# Patient Record
Sex: MALE | Race: WHITE | NOT HISPANIC OR LATINO | Employment: FULL TIME | URBAN - METROPOLITAN AREA
[De-identification: names, ages, dates, MRNs, and addresses within clinical notes are randomized per-mention and may not be internally consistent; named-entity substitution may affect disease eponyms.]

---

## 2017-02-13 RX ORDER — LOSARTAN POTASSIUM 50 MG/1
TABLET ORAL
Qty: 90 TAB | Refills: 3 | Status: SHIPPED | OUTPATIENT
Start: 2017-02-13 | End: 2017-06-21 | Stop reason: SDUPTHER

## 2017-02-13 NOTE — TELEPHONE ENCOUNTER
Was the patient seen in the last year in this department? Yes  Seen by Namita Ryder on 11/30/16    Does patient have an active prescription for medications requested? No     Received Request Via: Pharmacy

## 2017-02-27 ENCOUNTER — TELEPHONE (OUTPATIENT)
Dept: MEDICAL GROUP | Facility: MEDICAL CENTER | Age: 47
End: 2017-02-27

## 2017-02-27 DIAGNOSIS — Z20.2 POSSIBLE EXPOSURE TO STD: ICD-10-CM

## 2017-02-28 NOTE — TELEPHONE ENCOUNTER
.1. Caller Name: Pt                      Call Back Number:  [phone] (home)     2. Message: Pt called stating he would like lab orders placed for and STD check. Please place orders     3. Patient approves office to leave a detailed voicemail/MyChart message: yes

## 2017-03-02 LAB
HIV 1+2 AB+HIV1 P24 AG SERPL QL IA: NON REACTIVE
RPR SER QL: NON REACTIVE

## 2017-03-03 ENCOUNTER — TELEPHONE (OUTPATIENT)
Dept: MEDICAL GROUP | Facility: MEDICAL CENTER | Age: 47
End: 2017-03-03

## 2017-03-03 NOTE — TELEPHONE ENCOUNTER
----- Message from Jeet Leung M.D. sent at 3/3/2017  7:43 AM PST -----  Please notify patient is negative for HIV and syphilis. We have not received urine results yet but check for gonorrhea and chlamydia.  Jeet Leung M.D.

## 2017-03-15 ENCOUNTER — TELEPHONE (OUTPATIENT)
Dept: MEDICAL GROUP | Facility: MEDICAL CENTER | Age: 47
End: 2017-03-15

## 2017-03-15 DIAGNOSIS — A74.9 CHLAMYDIA: ICD-10-CM

## 2017-03-15 RX ORDER — AZITHROMYCIN 500 MG/1
1000 TABLET, FILM COATED ORAL ONCE
Qty: 2 TAB | Refills: 0 | Status: SHIPPED | OUTPATIENT
Start: 2017-03-15 | End: 2017-03-15

## 2017-03-15 NOTE — TELEPHONE ENCOUNTER
I sent him a prescription for azithromycin to treat chlamydia infection. Please have patient avoid sexual contact for 2 weeks.  Please complete public health form for positive STD.  Jeet Leung M.D.

## 2017-03-15 NOTE — TELEPHONE ENCOUNTER
1. Caller Name: Jason                      Call Back Number: [phone]    2. Message: Pt left message requesting results for UA test. He has discomfort in his penis and would like to get this treated. Please advise.     3. Patient approves office to leave a detailed voicemail/MyChart message: N\A

## 2017-03-27 ENCOUNTER — TELEPHONE (OUTPATIENT)
Dept: MEDICAL GROUP | Facility: MEDICAL CENTER | Age: 47
End: 2017-03-27

## 2017-03-27 NOTE — TELEPHONE ENCOUNTER
.1. Caller Name: Pt                      Call Back Number: [phone] (home)     2. Message: Pt called stating he has been feeling well. He has been very congested and coughing a lot for the last 5 days. He would like to know if there is a chance he can get something prescribed.     3. Patient approves office to leave a detailed voicemail/MyChart message: yes

## 2017-05-02 ENCOUNTER — TELEPHONE (OUTPATIENT)
Dept: MEDICAL GROUP | Facility: MEDICAL CENTER | Age: 47
End: 2017-05-02

## 2017-05-02 DIAGNOSIS — Z20.2 EXPOSURE TO STD: ICD-10-CM

## 2017-05-02 NOTE — TELEPHONE ENCOUNTER
1. Caller Name: Pt                      Call Back Number: [phone]     2. Message: Pt left message stating he previously tested positive for chlamydia. He is requesting an order for a repeat test to make sure it is cleared up.    3. Patient approves office to leave a detailed voicemail/MyChart message: N\A

## 2017-05-02 NOTE — TELEPHONE ENCOUNTER
Urine chlamydia test ordered. These have him drop off a urine sample at the lab.  Jeet Leung M.D.

## 2017-05-03 ENCOUNTER — HOSPITAL ENCOUNTER (OUTPATIENT)
Dept: LAB | Facility: MEDICAL CENTER | Age: 47
End: 2017-05-03
Attending: FAMILY MEDICINE
Payer: COMMERCIAL

## 2017-05-03 DIAGNOSIS — Z20.2 EXPOSURE TO STD: ICD-10-CM

## 2017-05-03 PROCEDURE — 87491 CHLMYD TRACH DNA AMP PROBE: CPT

## 2017-05-03 PROCEDURE — 87591 N.GONORRHOEAE DNA AMP PROB: CPT

## 2017-05-05 LAB
C TRACH DNA SPEC QL NAA+PROBE: POSITIVE
N GONORRHOEA DNA SPEC QL NAA+PROBE: NEGATIVE
SPECIMEN SOURCE: ABNORMAL

## 2017-05-08 ENCOUNTER — TELEPHONE (OUTPATIENT)
Dept: MEDICAL GROUP | Facility: MEDICAL CENTER | Age: 47
End: 2017-05-08

## 2017-05-08 DIAGNOSIS — A74.9 CHLAMYDIA: ICD-10-CM

## 2017-05-08 RX ORDER — AZITHROMYCIN 500 MG/1
1000 TABLET, FILM COATED ORAL ONCE
Qty: 2 TAB | Refills: 0 | Status: SHIPPED | OUTPATIENT
Start: 2017-05-08 | End: 2017-05-08

## 2017-05-08 NOTE — TELEPHONE ENCOUNTER
Prescription for azithromycin and sent to the pharmacy.  If his symptoms not resolve, please plan for him to come into the clinic. We will need to give him a shot and a different antibiotic.  Patient should refrain from sexual activity for at least 2 weeks of being symptom-free.  Jeet Leung M.D.

## 2017-05-08 NOTE — TELEPHONE ENCOUNTER
Pt notified. He doesn't think he was re-exposed and took both tabs of azithromycin all at once. He would like rx for new abx sent to [facility] [place]

## 2017-05-08 NOTE — TELEPHONE ENCOUNTER
----- Message from Jeet Leung M.D. sent at 5/8/2017  7:13 AM PDT -----  Please notify patient if he came back positive for chlamydia again.  We treated him 2 months ago for a chlamydia infection. Did he get reexposed? Was he able to take all the antibiotic tablets at once?  We will need to send in a new prescription for antibiotics.  Jeet Leung M.D.

## 2017-06-21 ENCOUNTER — TELEPHONE (OUTPATIENT)
Dept: MEDICAL GROUP | Facility: MEDICAL CENTER | Age: 47
End: 2017-06-21

## 2017-06-21 DIAGNOSIS — M1A.9XX0 CHRONIC GOUT WITHOUT TOPHUS, UNSPECIFIED CAUSE, UNSPECIFIED SITE: ICD-10-CM

## 2017-06-21 RX ORDER — LOSARTAN POTASSIUM 50 MG/1
50 TABLET ORAL
Qty: 90 TAB | Refills: 3 | Status: SHIPPED | OUTPATIENT
Start: 2017-06-21 | End: 2017-06-29 | Stop reason: SDUPTHER

## 2017-06-21 RX ORDER — ALLOPURINOL 300 MG/1
300 TABLET ORAL DAILY
Qty: 90 TAB | Refills: 3 | Status: SHIPPED | OUTPATIENT
Start: 2017-06-21 | End: 2017-06-29 | Stop reason: SDUPTHER

## 2017-06-21 NOTE — TELEPHONE ENCOUNTER
Was the patient seen in the last year in this department? Yes     Does patient have an active prescription for medications requested? No     Received Request Via: Pharmacy     Last seen: 11/30/2016 Aleksey

## 2017-06-21 NOTE — TELEPHONE ENCOUNTER
1. Caller Name: Demarco Hickey                                         Call Back Number: [phone]       Patient approves a detailed voicemail message: yes    Pt called in to report Symptoms for Chlamydia jave not fully resolved, per last note on this subject MD stated Pt will need to come in for another antibiotic and an injection. Pt transferred to scheduling to make an apt.

## 2017-06-22 ENCOUNTER — HOSPITAL ENCOUNTER (OUTPATIENT)
Facility: MEDICAL CENTER | Age: 47
End: 2017-06-22
Attending: NURSE PRACTITIONER
Payer: COMMERCIAL

## 2017-06-22 ENCOUNTER — OFFICE VISIT (OUTPATIENT)
Dept: MEDICAL GROUP | Facility: MEDICAL CENTER | Age: 47
End: 2017-06-22
Payer: COMMERCIAL

## 2017-06-22 VITALS
RESPIRATION RATE: 16 BRPM | BODY MASS INDEX: 38.21 KG/M2 | WEIGHT: 258 LBS | TEMPERATURE: 97.5 F | SYSTOLIC BLOOD PRESSURE: 136 MMHG | DIASTOLIC BLOOD PRESSURE: 82 MMHG | OXYGEN SATURATION: 95 % | HEIGHT: 69 IN | HEART RATE: 107 BPM

## 2017-06-22 DIAGNOSIS — E66.9 OBESITY (BMI 30-39.9): ICD-10-CM

## 2017-06-22 DIAGNOSIS — A74.9 CHLAMYDIA INFECTION: ICD-10-CM

## 2017-06-22 PROCEDURE — 99214 OFFICE O/P EST MOD 30 MIN: CPT | Performed by: FAMILY MEDICINE

## 2017-06-22 PROCEDURE — 99999 PR NO CHARGE: CPT | Performed by: FAMILY MEDICINE

## 2017-06-22 PROCEDURE — 87591 N.GONORRHOEAE DNA AMP PROB: CPT

## 2017-06-22 PROCEDURE — 87491 CHLMYD TRACH DNA AMP PROBE: CPT

## 2017-06-22 RX ORDER — DOXYCYCLINE HYCLATE 100 MG
100 TABLET ORAL 2 TIMES DAILY
Qty: 14 TAB | Refills: 0 | Status: SHIPPED | OUTPATIENT
Start: 2017-06-22 | End: 2017-06-29

## 2017-06-22 RX ORDER — CEFTRIAXONE SODIUM 250 MG/1
250 INJECTION, POWDER, FOR SOLUTION INTRAMUSCULAR; INTRAVENOUS ONCE
Status: COMPLETED | OUTPATIENT
Start: 2017-06-22 | End: 2017-06-22

## 2017-06-22 RX ADMIN — CEFTRIAXONE SODIUM 250 MG: 250 INJECTION, POWDER, FOR SOLUTION INTRAMUSCULAR; INTRAVENOUS at 15:33

## 2017-06-22 ASSESSMENT — PATIENT HEALTH QUESTIONNAIRE - PHQ9: CLINICAL INTERPRETATION OF PHQ2 SCORE: 0

## 2017-06-22 NOTE — MR AVS SNAPSHOT
"        Demarco Hickey   2017 3:00 PM   Office Visit   MRN: [id number]    Department:  [facility]   Dept Phone:  [phone]    Description:  Male : 1970   Provider:  Jeet Leung M.D.           Reason for Visit     Follow-Up     Injections           Allergies as of 2017     Allergen Noted Reactions    Nkda [No Known Drug Allergy] 2014         You were diagnosed with     Chlamydia infection   [926220]       Obesity (BMI 30-39.9)   [316764]         Vital Signs     Blood Pressure Pulse Temperature Respirations Height Weight    136/82 mmHg 107 36.4 °C (97.5 °F) 16 1.753 m (5' 9.02\") 117.028 kg (258 lb)    Body Mass Index Oxygen Saturation Smoking Status             38.08 kg/m2 95% Never Smoker          Basic Information     Date Of Birth Sex Race Ethnicity Preferred Language    1970 Male White Non- English      Problem List              ICD-10-CM Priority Class Noted - Resolved    Hypertension I10   2014 - Present    Gout, chronic M1A.9XX0   2014 - Present    Obesity E66.9   2014 - Present    Dyslipidemia E78.5   2014 - Present    Sleep apnea G47.30   2014 - Present    Possible exposure to STD Z20.2   2015 - Present    Chlamydia infection A74.9   2017 - Present    Obesity (BMI 30-39.9) E66.9   2017 - Present      Health Maintenance        Date Due Completion Dates    IMM DTaP/Tdap/Td Vaccine (1 - Tdap) 1990 ---            Current Immunizations     No immunizations on file.      Below and/or attached are the medications your provider expects you to take. Review all of your home medications and newly ordered medications with your provider and/or pharmacist. Follow medication instructions as directed by your provider and/or pharmacist. Please keep your medication list with you and share with your provider. Update the information when medications are discontinued, doses are changed, or new medications (including over-the-counter " products) are added; and carry medication information at all times in the event of emergency situations     Allergies:  NKDA - (reactions not documented)               Medications  Valid as of: June 22, 2017 -  4:01 PM    Generic Name Brand Name Tablet Size Instructions for use    Allopurinol (Tab) ZYLOPRIM 300 MG Take 1 Tab by mouth every day.        Doxycycline Hyclate (Tab) VIBRAMYCIN 100 MG Take 1 Tab by mouth 2 times a day for 7 days.        Losartan Potassium (Tab) COZAAR 50 MG Take 1 Tab by mouth every day.        .                 Medicines prescribed today were sent to:     [facility] #[id number] - [place], [place] - [address] [place] [address]    Phone: [phone] Fax: [phone]    Open 24 Hours?: No      Medication refill instructions:       If your prescription bottle indicates you have medication refills left, it is not necessary to call your provider’s office. Please contact your pharmacy and they will refill your medication.    If your prescription bottle indicates you do not have any refills left, you may request refills at any time through one of the following ways: The online [facility] system (except Urgent Care), by calling your provider’s office, or by asking your pharmacy to contact your provider’s office with a refill request. Medication refills are processed only during regular business hours and may not be available until the next business day. Your provider may request additional information or to have a follow-up visit with you prior to refilling your medication.   *Please Note: Medication refills are assigned a new Rx number when refilled electronically. Your pharmacy may indicate that no refills were authorized even though a new prescription for the same medication is available at the pharmacy. Please request the medicine by name with the pharmacy before contacting your provider for a refill.        Your To Do List     Future Labs/Procedures Complete By Expires    CHLAMYDIA/GC PCR  URINE OR SWAB  As directed 6/23/2018         MyChart Access Code: Activation code not generated  Current MyCFundedByMet Status: Active

## 2017-06-22 NOTE — ASSESSMENT & PLAN NOTE
Patient has been treated for chlamydia twice in the last 4 months. He was treated with azithromycin 1000 mg once for each infection. Since his second infection, 1 month ago, he was in the [facility] with his [place] girlfriend. He feels like he is having recurrent symptoms. No pussy drainage or skin lesions that he has noticed. Patient had a full STD check including HIV and hepatitis 3 months ago.

## 2017-06-22 NOTE — PROGRESS NOTES
"Subjective:   Demarco Hickey is a 47 y.o. male here today for chlamydia infection    Chlamydia infection  Patient has been treated for chlamydia twice in the last 4 months. He was treated with azithromycin 1000 mg once for each infection. Since his second infection, 1 month ago, he was in the [facility] with his [facility] girlfriend. He feels like he is having recurrent symptoms. No pussy drainage or skin lesions that he has noticed. Patient had a full STD check including HIV and hepatitis 3 months ago.    Obesity (BMI 30-39.9)  Patient's weight has increased over the last several years.           Current medicines (including changes today)  Current Outpatient Prescriptions   Medication Sig Dispense Refill   • doxycycline (VIBRAMYCIN) 100 MG Tab Take 1 Tab by mouth 2 times a day for 7 days. 14 Tab 0   • allopurinol (ZYLOPRIM) 300 MG Tab Take 1 Tab by mouth every day. 90 Tab 3   • losartan (COZAAR) 50 MG Tab Take 1 Tab by mouth every day. 90 Tab 3     Current Facility-Administered Medications   Medication Dose Route Frequency Provider Last Rate Last Dose   • cefTRIAXone (ROCEPHIN) injection 250 mg  250 mg Intramuscular Once Jeet Leung M.D.         He  has a past medical history of Hypertension (5/20/2014); Gout, chronic (5/20/2014); and Obesity (5/20/2014).    ROS   No fever, no purulent urethral drainage, no flank pain       Objective:     Blood pressure 136/82, pulse 107, temperature 36.4 °C (97.5 °F), resp. rate 16, height 1.753 m (5' 9.02\"), weight 117.028 kg (258 lb), SpO2 95 %. Body mass index is 38.08 kg/(m^2).   Physical Exam:  Constitutional: Alert, no distress.  Skin: Warm, dry, good turgor, no rashes in visible areas.  Eye: Equal, round and reactive, conjunctiva clear, lids normal.  Psych: Alert and oriented x3, normal affect and mood.        Assessment and Plan:   The following treatment plan was discussed    1. Chlamydia infection  Rocephin 250 mg IM given today, doxycycline 100 mg for 7 days, " emphasizing importance of being compliant with doxycycline. Advised no sexual activity 2 weeks after completing doxycycline. Advise condoms with sexual partners.  Chlamydia and gonorrhea urine sample collected.  - cefTRIAXone (ROCEPHIN) injection 250 mg; 250 mg by Intramuscular route Once.  - doxycycline (VIBRAMYCIN) 100 MG Tab; Take 1 Tab by mouth 2 times a day for 7 days.  Dispense: 14 Tab; Refill: 0  - CHLAMYDIA/GC PCR URINE OR SWAB; Future    2. Obesity (BMI 30-39.9)  - Patient identified as having weight management issue.  Appropriate orders and counseling given.      Followup: Return if symptoms worsen or fail to improve.

## 2017-06-23 DIAGNOSIS — A74.9 CHLAMYDIA INFECTION: ICD-10-CM

## 2017-06-26 ENCOUNTER — TELEPHONE (OUTPATIENT)
Dept: MEDICAL GROUP | Facility: MEDICAL CENTER | Age: 47
End: 2017-06-26

## 2017-06-26 NOTE — TELEPHONE ENCOUNTER
----- Message from Jeet Leung M.D. sent at 6/26/2017  7:37 AM PDT -----  Please notify patient that he is positive for chlamydia. Have him complete antibiotics as prescribed. His partners should also be treated by their primary care doctor or at the health department if they do not have a primary care doctor. Patient should use condoms to prevent STD exposure.  Jeet Leung M.D.

## 2017-06-29 DIAGNOSIS — M1A.9XX0 CHRONIC GOUT WITHOUT TOPHUS, UNSPECIFIED CAUSE, UNSPECIFIED SITE: ICD-10-CM

## 2017-06-29 RX ORDER — LOSARTAN POTASSIUM 50 MG/1
50 TABLET ORAL
Qty: 90 TAB | Refills: 3 | Status: SHIPPED | OUTPATIENT
Start: 2017-06-29 | End: 2018-05-31 | Stop reason: SDUPTHER

## 2017-06-29 RX ORDER — ALLOPURINOL 300 MG/1
300 TABLET ORAL DAILY
Qty: 90 TAB | Refills: 3 | Status: SHIPPED | OUTPATIENT
Start: 2017-06-29 | End: 2018-07-19 | Stop reason: SDUPTHER

## 2017-06-29 NOTE — TELEPHONE ENCOUNTER
Patient requesting mail order - 3 months supply   Was the patient seen in the last year in this department? Yes     Does patient have an active prescription for medications requested? No     Received Request Via: Pharmacy     Last seen:06/22/2017 [place]

## 2018-06-01 RX ORDER — LOSARTAN POTASSIUM 50 MG/1
TABLET ORAL
Qty: 90 TAB | Refills: 0 | Status: SHIPPED | OUTPATIENT
Start: 2018-06-01 | End: 2018-08-29 | Stop reason: SDUPTHER

## 2018-06-01 NOTE — TELEPHONE ENCOUNTER
Refill done. Patient is due for annual appointment. Please have patient schedule.  Jeet Leung M.D.

## 2018-06-19 ENCOUNTER — OFFICE VISIT (OUTPATIENT)
Dept: MEDICAL GROUP | Facility: MEDICAL CENTER | Age: 48
End: 2018-06-19
Payer: COMMERCIAL

## 2018-06-19 VITALS
HEIGHT: 69 IN | TEMPERATURE: 98 F | SYSTOLIC BLOOD PRESSURE: 102 MMHG | OXYGEN SATURATION: 98 % | DIASTOLIC BLOOD PRESSURE: 90 MMHG | WEIGHT: 265 LBS | HEART RATE: 107 BPM | BODY MASS INDEX: 39.25 KG/M2

## 2018-06-19 DIAGNOSIS — N50.819 TESTICULAR PAIN, UNSPECIFIED: ICD-10-CM

## 2018-06-19 DIAGNOSIS — A74.9 CHLAMYDIA: ICD-10-CM

## 2018-06-19 DIAGNOSIS — E78.5 HYPERLIPIDEMIA LDL GOAL <100: ICD-10-CM

## 2018-06-19 DIAGNOSIS — R10.30 LOWER ABDOMINAL PAIN: ICD-10-CM

## 2018-06-19 DIAGNOSIS — E66.9 OBESITY (BMI 35.0-39.9 WITHOUT COMORBIDITY): ICD-10-CM

## 2018-06-19 PROCEDURE — 99214 OFFICE O/P EST MOD 30 MIN: CPT | Performed by: NURSE PRACTITIONER

## 2018-06-19 ASSESSMENT — PATIENT HEALTH QUESTIONNAIRE - PHQ9: CLINICAL INTERPRETATION OF PHQ2 SCORE: 0

## 2018-06-19 NOTE — PROGRESS NOTES
Subjective:     Demarco Hickey is a 48 y.o. male who presents with lower abd pain.    HPI:     Seen in f/u for lower abd pain.  He has a hx of inguinal hernia repair.  He has been working out to loose weight.  That is when the pain started.  He is careful with lifting.   Pain occurs intermittently.  bm's are wnl.  No dysuria.  Most of the pain is in his testicles.  He doesn't know which one is worse.  No hx of testicular cancer.  That is the sx that he had in the past with his previous inguinal hernia.   He has a sore in his rt gum.  He is going to see an oral surgeon in august.  Painful.  Will get better and then wiorse.  He does not chew tobacco.    His last lab in 2016 showed elevated trg and LDL.  He is overdue repeat lab.    He recently had chlamydia.  Was trerated but never f/u for recheck.  No fever, chills or sweating.  No penile dg.      Patient Active Problem List    Diagnosis Date Noted   • Obesity (BMI 35.0-39.9 without comorbidity) ([facility]) 06/19/2018   • Chlamydia infection 06/22/2017   • Obesity (BMI 30-39.9) 06/22/2017   • Possible exposure to STD 08/12/2015   • Hypertension 05/20/2014   • Gout, chronic 05/20/2014   • Obesity 05/20/2014   • Dyslipidemia 05/20/2014   • Sleep apnea 05/20/2014       Current medicines (including changes today)  Current Outpatient Prescriptions   Medication Sig Dispense Refill   • losartan (COZAAR) 50 MG Tab TAKE 1 TABLET DAILY 90 Tab 0   • allopurinol (ZYLOPRIM) 300 MG Tab Take 1 Tab by mouth every day. 90 Tab 3     No current facility-administered medications for this visit.        Allergies   Allergen Reactions   • Nkda [No Known Drug Allergy]        ROS  Constitutional: Negative. Negative for fever, chills, weight loss, malaise/fatigue and diaphoresis.   HENT: Negative. Negative for hearing loss, ear pain, nosebleeds, congestion, sore throat, neck pain, tinnitus and ear discharge.   Respiratory: Negative. Negative for cough, hemoptysis, sputum production, shortness of  "breath, wheezing and stridor.   Cardiovascular: Negative. Negative for chest pain, palpitations, orthopnea, claudication, leg swelling and PND.   Gastrointestinal: Denies nausea, vomiting, diarrhea, constipation, heartburn, melena or hematochezia.  Genitourinary: Denies dysuria, hematuria, urinary incontinence, frequency or urgency.        Objective:     Blood pressure 102/90, pulse (!) 107, temperature 36.7 °C (98 °F), height 1.753 m (5' 9\"), weight 120.2 kg (265 lb), SpO2 98 %. Body mass index is 39.13 kg/m².    Physical Exam:  Physical Exam   Vitals reviewed.  Constitutional: oriented to person, place, and time. appears well-developed and well-nourished. No distress.   Cardiovascular: Normal rate, regular rhythm, normal heart sounds and intact distal pulses.  Exam reveals no gallop and no friction rub.  No murmur heard.  No carotid bruits.   Pulmonary/Chest: Effort normal and breath sounds normal. No stridor. No respiratory distress. no wheezes or rales. exhibits no tenderness.   Musculoskeletal: Normal range of motion. exhibits no edema. diana pedal pulses 2+.  Lymphadenopathy: no cervical or supraclavicular adenopathy.   Abd:  No CVAT,  Soft,  Bs noted in all quadrants.  No HSM.  No abdominal tenderness.  Poss left inguinal hernia palp.  No testicular nodules noted.    Neurological: alert and oriented to person, place, and time. exhibits normal muscle tone. Coordination normal.   Skin: Skin is warm and dry. no diaphoresis.   Psychiatric: normal mood and affect. behavior is normal.        Assessment and Plan:     The following treatment plan was discussed:    1. Lower abdominal pain  US-ABDOMEN LIMITED    check abd us limited for hernia.  if neg and pain continues and not r/t chlamydia then consider testicular us. f/u with pt with results.    2. Testicular pain, unspecified  US-ABDOMEN LIMITED    no nodules noted.  will consider scrotal us if other w/u is neg   3. Hyperlipidemia LDL goal <100  CMP14+LP    needs " updated LP, CMP.  trying to improve diet and exercise.  dolab and f/u with HS   4. Chlamydia  CHLAMYDIA, SWAB/URINE, PCR    recent tx for infection.  will check for clearing.     5. Obesity (BMI 35.0-39.9 without comorbidity)     6. BMI 39.0-39.9,adult  Patient identified as having weight management issue.  Appropriate orders and counseling given.         Followup: Return if symptoms worsen or fail to improve.

## 2018-06-20 ENCOUNTER — TELEPHONE (OUTPATIENT)
Dept: MEDICAL GROUP | Facility: MEDICAL CENTER | Age: 48
End: 2018-06-20

## 2018-06-20 LAB
C TRACH RRNA SPEC QL NAA+PROBE: NEGATIVE
N GONORRHOEA RRNA SPEC QL NAA+PROBE: NEGATIVE

## 2018-06-20 NOTE — LETTER
June 26, 2018        Demarco Hickey  [address]   [place] [address]        Dear Demarco:    Shanice Argueta's office has tried contacting you. At your earliest convenience please contact our office at [phone].          If you have any questions or concerns, please don't hesitate to call.        Sincerely,        RIDGE Choi.    Electronically Signed

## 2018-06-21 ENCOUNTER — HOSPITAL ENCOUNTER (OUTPATIENT)
Dept: RADIOLOGY | Facility: MEDICAL CENTER | Age: 48
End: 2018-06-21
Attending: NURSE PRACTITIONER
Payer: COMMERCIAL

## 2018-06-21 DIAGNOSIS — N50.819 TESTICULAR PAIN, UNSPECIFIED: ICD-10-CM

## 2018-06-21 DIAGNOSIS — R10.30 LOWER ABDOMINAL PAIN: ICD-10-CM

## 2018-06-21 PROCEDURE — 76857 US EXAM PELVIC LIMITED: CPT

## 2018-06-23 ENCOUNTER — TELEPHONE (OUTPATIENT)
Dept: MEDICAL GROUP | Facility: MEDICAL CENTER | Age: 48
End: 2018-06-23

## 2018-06-23 NOTE — TELEPHONE ENCOUNTER
Please let pt know that the abd us shows small left inguinal hernia.  if he is ready to have it fixed i will refer to surgery.  Just let me know.

## 2018-07-19 DIAGNOSIS — M1A.9XX0 CHRONIC GOUT WITHOUT TOPHUS, UNSPECIFIED CAUSE, UNSPECIFIED SITE: ICD-10-CM

## 2018-07-20 RX ORDER — ALLOPURINOL 300 MG/1
TABLET ORAL
Qty: 90 TAB | Refills: 3 | Status: SHIPPED | OUTPATIENT
Start: 2018-07-20 | End: 2019-07-14 | Stop reason: SDUPTHER

## 2018-08-30 RX ORDER — LOSARTAN POTASSIUM 50 MG/1
TABLET ORAL
Qty: 90 TAB | Refills: 3 | Status: SHIPPED | OUTPATIENT
Start: 2018-08-30 | End: 2018-11-15

## 2018-11-14 ENCOUNTER — TELEPHONE (OUTPATIENT)
Dept: MEDICAL GROUP | Facility: MEDICAL CENTER | Age: 48
End: 2018-11-14

## 2018-11-15 RX ORDER — ENALAPRIL MALEATE 20 MG/1
20 TABLET ORAL DAILY
Qty: 90 TAB | Refills: 3 | Status: SHIPPED | OUTPATIENT
Start: 2018-11-15 | End: 2019-10-17 | Stop reason: SDUPTHER

## 2018-11-15 NOTE — TELEPHONE ENCOUNTER
An alternative would be enalapril 20 mg daily.  Please ask if he would like me to send a prescription to [facility] for enalapril.  Jeet Leung M.D.

## 2018-11-15 NOTE — TELEPHONE ENCOUNTER
Patient is concerned that Losartan causes cancer. He would like to be prescribed an alternative medication. Please advise.

## 2019-05-23 ENCOUNTER — TELEPHONE (OUTPATIENT)
Dept: MEDICAL GROUP | Facility: MEDICAL CENTER | Age: 49
End: 2019-05-23

## 2019-05-23 DIAGNOSIS — Z20.2 STD EXPOSURE: ICD-10-CM

## 2019-05-23 NOTE — TELEPHONE ENCOUNTER
Patient reports possible exposure to STDs. He is requesting lab order to screen for this. Please advise.

## 2019-05-24 ENCOUNTER — HOSPITAL ENCOUNTER (OUTPATIENT)
Dept: LAB | Facility: MEDICAL CENTER | Age: 49
End: 2019-05-24
Attending: FAMILY MEDICINE
Payer: COMMERCIAL

## 2019-05-24 DIAGNOSIS — Z20.2 STD EXPOSURE: ICD-10-CM

## 2019-05-24 LAB
HCV AB SER QL: NEGATIVE
HIV 1+2 AB+HIV1 P24 AG SERPL QL IA: NON REACTIVE
TREPONEMA PALLIDUM IGG+IGM AB [PRESENCE] IN SERUM OR PLASMA BY IMMUNOASSAY: NON REACTIVE

## 2019-05-24 PROCEDURE — 86780 TREPONEMA PALLIDUM: CPT

## 2019-05-24 PROCEDURE — 36415 COLL VENOUS BLD VENIPUNCTURE: CPT

## 2019-05-24 PROCEDURE — 86803 HEPATITIS C AB TEST: CPT

## 2019-05-24 PROCEDURE — 87491 CHLMYD TRACH DNA AMP PROBE: CPT

## 2019-05-24 PROCEDURE — 87389 HIV-1 AG W/HIV-1&-2 AB AG IA: CPT

## 2019-05-24 PROCEDURE — 87591 N.GONORRHOEAE DNA AMP PROB: CPT

## 2019-05-25 LAB
C TRACH DNA SPEC QL NAA+PROBE: NEGATIVE
N GONORRHOEA DNA SPEC QL NAA+PROBE: NEGATIVE
SPECIMEN SOURCE: NORMAL

## 2019-05-28 ENCOUNTER — TELEPHONE (OUTPATIENT)
Dept: MEDICAL GROUP | Facility: MEDICAL CENTER | Age: 49
End: 2019-05-28

## 2019-05-28 NOTE — TELEPHONE ENCOUNTER
----- Message from Jeet Leung M.D. sent at 5/28/2019  7:28 AM PDT -----  Please notify patient that all STD testing is normal.    Dr. Leung

## 2019-07-14 DIAGNOSIS — M1A.9XX0 CHRONIC GOUT WITHOUT TOPHUS, UNSPECIFIED CAUSE, UNSPECIFIED SITE: ICD-10-CM

## 2019-07-15 RX ORDER — ALLOPURINOL 300 MG/1
TABLET ORAL
Qty: 90 TAB | Refills: 0 | Status: SHIPPED | OUTPATIENT
Start: 2019-07-15 | End: 2019-10-13 | Stop reason: SDUPTHER

## 2019-10-18 RX ORDER — ENALAPRIL MALEATE 20 MG/1
TABLET ORAL
Qty: 90 TAB | Refills: 0 | Status: SHIPPED | OUTPATIENT
Start: 2019-10-18 | End: 2020-01-21

## 2020-01-19 NOTE — LETTER
January 21, 2020        Demarco Hickey  [address]   [place] [address]        Dear Demarco:    After careful review of your chart, we have noted you are due for  an annual appointment.  We request you call our office at [phone] at your earliest convenience and     We look forward to scheduling an appointment for you, so that we may provide you with the safest and most complete medical care.        If you have any questions or concerns, please don't hesitate to call.        Sincerely,        Roslyn Jenkins P.A.-C.    Electronically Signed

## 2020-01-21 RX ORDER — ENALAPRIL MALEATE 20 MG/1
TABLET ORAL
Qty: 90 TAB | Refills: 0 | Status: SHIPPED | OUTPATIENT
Start: 2020-01-21 | End: 2020-04-20

## 2020-05-22 ENCOUNTER — TELEPHONE (OUTPATIENT)
Dept: HEALTH INFORMATION MANAGEMENT | Facility: OTHER | Age: 50
End: 2020-05-22

## 2020-05-22 NOTE — TELEPHONE ENCOUNTER
1. Caller Name: Jason                 Call Back Number: [phone]  [facility] PCP or Specialty Provider: Yes Jeet Leung        2.  Does patient have any new or worsening symptoms of respiratory illness? Yes, the patient reports the following respiratory symptoms: cough, muscle pain, headache and nausea. Taking baby aspirin as advised by MD for HTN but also to help his symptoms. No other OTC medications at this time. Reports general tightness in chest. No history of anginal episodes. Did experience an episode of light-headness/dizziness yesterday after running errands. No diaphoresis with episode. No heart palpitations/bounding heart beat. Does not own a BP cuff; currently taking enalapril. States he suspects he has sleep apnea as he snores; no orthopnea. Not followed by Cardiologist.    3.  Does patient have any comoribidities? None HTN, obesity    4.  Has the patient traveled in the last 14 days OR had any known contact with someone who is suspected or confirmed to have COVID-19?  No.    5. Disposition: Offered patient virtual visit to limit potential exposure to others; patient also given self care instructions. Virtual visit with PCP scheduled as patient is due for annual visit, as well. Patient given [facility] contact information as well as new additional [facility] testing site locations as patient states that he is booked to travel to [place] to visit his parents shortly and is concerned.      Note routed to [facility] Provider: FYI only.

## 2020-05-28 ENCOUNTER — APPOINTMENT (OUTPATIENT)
Dept: MEDICAL GROUP | Facility: MEDICAL CENTER | Age: 50
End: 2020-05-28
Payer: COMMERCIAL

## 2020-06-18 ENCOUNTER — TELEMEDICINE (OUTPATIENT)
Dept: MEDICAL GROUP | Facility: MEDICAL CENTER | Age: 50
End: 2020-06-18
Payer: COMMERCIAL

## 2020-06-18 VITALS — HEIGHT: 69 IN | WEIGHT: 260 LBS | BODY MASS INDEX: 38.51 KG/M2

## 2020-06-18 DIAGNOSIS — E78.5 DYSLIPIDEMIA: ICD-10-CM

## 2020-06-18 DIAGNOSIS — M1A.9XX0 CHRONIC GOUT WITHOUT TOPHUS, UNSPECIFIED CAUSE, UNSPECIFIED SITE: ICD-10-CM

## 2020-06-18 DIAGNOSIS — Z00.00 ANNUAL PHYSICAL EXAM: ICD-10-CM

## 2020-06-18 DIAGNOSIS — I10 ESSENTIAL HYPERTENSION: ICD-10-CM

## 2020-06-18 DIAGNOSIS — H92.01 RIGHT EAR PAIN: ICD-10-CM

## 2020-06-18 PROBLEM — K40.90 LEFT INGUINAL HERNIA: Status: ACTIVE | Noted: 2020-06-18

## 2020-06-18 PROBLEM — A74.9 CHLAMYDIA INFECTION: Status: RESOLVED | Noted: 2017-06-22 | Resolved: 2020-06-18

## 2020-06-18 PROCEDURE — 99396 PREV VISIT EST AGE 40-64: CPT | Mod: 95,CR | Performed by: FAMILY MEDICINE

## 2020-06-18 RX ORDER — ALLOPURINOL 300 MG/1
300 TABLET ORAL DAILY
Qty: 90 TAB | Refills: 3 | Status: SHIPPED | OUTPATIENT
Start: 2020-06-18 | End: 2021-03-31 | Stop reason: SDUPTHER

## 2020-06-18 RX ORDER — ENALAPRIL MALEATE 20 MG/1
20 TABLET ORAL DAILY
Qty: 90 TAB | Refills: 3 | Status: SHIPPED | OUTPATIENT
Start: 2020-06-18 | End: 2021-03-31 | Stop reason: SDUPTHER

## 2020-06-18 NOTE — PROGRESS NOTES
Telemedicine Visit: Established Patient     This encounter was conducted via Zoom .   Verbal consent was obtained. Patient's identity was verified.    Subjective:   CC: adelaida Hickey is a 50 y.o. male presenting for evaluation and management of:    Patient is currently tolerating allopurinol 300 mg daily and enalapril 20 mg daily.  He has not checked his blood pressure recently.  Patient is recently  and he will be taking a road trip across the country to [facility], where his parents live.    Patient has had right ear pain for the last 3 weeks, on and off, feeling like fluid in his ear.  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  Patient is requesting referral to see an ear specialist.    Patient has not done blood work in approximately 4 years.    ROS   Denies any recent fevers or chills. No chest pains or shortness of breath.     Allergies   Allergen Reactions   • Nkda [No Known Drug Allergy]        Current medicines (including changes today)  Current Outpatient Medications   Medication Sig Dispense Refill   • allopurinol (ZYLOPRIM) 300 MG Tab Take 1 Tab by mouth every day. 90 Tab 3   • enalapril (VASOTEC) 20 MG tablet Take 1 Tab by mouth every day. 90 Tab 3     No current facility-administered medications for this visit.        Patient Active Problem List    Diagnosis Date Noted   • Left inguinal hernia 06/18/2020   • Obesity (BMI 35.0-39.9 without comorbidity) ([facility]) 06/19/2018   • Obesity (BMI 30-39.9) 06/22/2017   • Hypertension 05/20/2014   • Gout, chronic 05/20/2014   • Obesity 05/20/2014   • Dyslipidemia 05/20/2014   • Sleep apnea 05/20/2014       Family History   Problem Relation Age of Onset   • Diabetes Mother    • Hypertension Mother    • Hypertension Father    • Hypertension Brother        He  has a past medical history of Gout, chronic (5/20/2014), Hypertension (5/20/2014), and Obesity (5/20/2014).  He  has a past surgical history that includes hernia  "repair (1988).       Objective:   Vitals obtained by patient:  Ht 1.753 m (5' 9\")   Wt 117.9 kg (260 lb)     Physical Exam:  Constitutional: Alert, no distress, well-groomed.  Skin: No rashes in visible areas.  Eye: Round. Conjunctiva clear, lids normal. No icterus.   ENMT: Lips pink without lesions, good dentition, moist mucous membranes. Phonation normal.  Neck: No masses, no thyromegaly. Moves freely without pain.  CV: Pulse as reported by patient  Respiratory: Unlabored respiratory effort, no cough or audible wheeze  Psych: Alert and oriented x3, normal affect and mood.       Assessment and Plan:   The following treatment plan was discussed:     1. Annual physical exam  Encouraged healthy lifestyle.  Check labs and call with results.  - CBC WITH DIFFERENTIAL; Future  - Comp Metabolic Panel; Future  - Lipid Profile; Future  - HEMOGLOBIN A1C; Future  - TSH WITH REFLEX TO FT4; Future  - PROSTATE SPECIFIC AG SCREENING; Future  - URIC ACID; Future    2. Right ear pain  Referral to ENT per patient's request.  Unable to do an ear exam over Zoom.  - REFERRAL TO ENT    3. Chronic gout without tophus, unspecified cause, unspecified site  Controlled.  Continue allopurinol.  - allopurinol (ZYLOPRIM) 300 MG Tab; Take 1 Tab by mouth every day.  Dispense: 90 Tab; Refill: 3    4. Essential hypertension  Advised patient to monitor blood pressure.  Continue enalapril.  - enalapril (VASOTEC) 20 MG tablet; Take 1 Tab by mouth every day.  Dispense: 90 Tab; Refill: 3    5. Dyslipidemia  Check labs and call with results.          Follow-up: Return in about 1 year (around 6/18/2021) for Annual.           "

## 2021-03-31 ENCOUNTER — OFFICE VISIT (OUTPATIENT)
Dept: MEDICAL GROUP | Facility: PHYSICIAN GROUP | Age: 51
End: 2021-03-31
Payer: COMMERCIAL

## 2021-03-31 VITALS
BODY MASS INDEX: 38.79 KG/M2 | TEMPERATURE: 97.7 F | SYSTOLIC BLOOD PRESSURE: 100 MMHG | HEIGHT: 69 IN | DIASTOLIC BLOOD PRESSURE: 80 MMHG | OXYGEN SATURATION: 93 % | WEIGHT: 261.91 LBS | HEART RATE: 86 BPM

## 2021-03-31 DIAGNOSIS — M1A.9XX0 CHRONIC GOUT WITHOUT TOPHUS, UNSPECIFIED CAUSE, UNSPECIFIED SITE: ICD-10-CM

## 2021-03-31 DIAGNOSIS — N52.9 ERECTILE DYSFUNCTION, UNSPECIFIED ERECTILE DYSFUNCTION TYPE: ICD-10-CM

## 2021-03-31 DIAGNOSIS — I10 ESSENTIAL HYPERTENSION: ICD-10-CM

## 2021-03-31 DIAGNOSIS — Z23 NEED FOR TDAP VACCINATION: ICD-10-CM

## 2021-03-31 DIAGNOSIS — Z12.5 SCREENING FOR PROSTATE CANCER: ICD-10-CM

## 2021-03-31 DIAGNOSIS — E78.5 DYSLIPIDEMIA: ICD-10-CM

## 2021-03-31 DIAGNOSIS — H61.23 IMPACTED CERUMEN, BILATERAL: ICD-10-CM

## 2021-03-31 PROCEDURE — 99214 OFFICE O/P EST MOD 30 MIN: CPT | Mod: 25 | Performed by: FAMILY MEDICINE

## 2021-03-31 PROCEDURE — 90715 TDAP VACCINE 7 YRS/> IM: CPT | Performed by: FAMILY MEDICINE

## 2021-03-31 PROCEDURE — 69210 REMOVE IMPACTED EAR WAX UNI: CPT | Performed by: FAMILY MEDICINE

## 2021-03-31 PROCEDURE — 90471 IMMUNIZATION ADMIN: CPT | Performed by: FAMILY MEDICINE

## 2021-03-31 RX ORDER — ALLOPURINOL 300 MG/1
300 TABLET ORAL DAILY
Qty: 90 TABLET | Refills: 3 | Status: SHIPPED | OUTPATIENT
Start: 2021-03-31 | End: 2022-05-16

## 2021-03-31 RX ORDER — ENALAPRIL MALEATE 20 MG/1
20 TABLET ORAL DAILY
Qty: 90 TABLET | Refills: 3 | Status: SHIPPED | OUTPATIENT
Start: 2021-03-31 | End: 2022-05-24

## 2021-03-31 ASSESSMENT — PATIENT HEALTH QUESTIONNAIRE - PHQ9: CLINICAL INTERPRETATION OF PHQ2 SCORE: 0

## 2021-04-01 NOTE — PROGRESS NOTES
Subjective:      Demarco Hickey is a 50 y.o. male who presents with New Patient (N2U/Madhu)            HPI     This is a 50-year-old male who was previously under the care of Dr. Jeet Leung.  He is here to get established with me because previous PCP left the practice.    He has hypertension and this is under control on enalapril 20 mg 1 tablet daily.    He also has history of gout involving the ankles of both feet.  He has been on allopurinol 300 mg and he said he only takes it 1 tablet every other day.  He said he has not had an attack in 2 to 3 years time.  If he gets an attack he takes Aleve with good results.    He said he was prescribed Viagra long time ago for ED and it worked very well for him without side effects.  He said he did not have the need to take it for a while.  He recently got  about 2 years ago and lately he feels that his erection is not lasting as long as he expects and he is asking to have a prescription for the Viagra.    He also wants me to check his ears.  From time to time he feels some crunching noise in the right ear but he denies any pain or decreased hearing.    He has history of dyslipidemia which is managed with his own efforts with the last lipid panel was a while ago in 2016 with total cholesterol of 202, triglycerides 168, HDL 31, .    He needs to get Tdap.  He already completed the COVID-19 vaccination.  He will turn 51 in April and so he will need screening PSA as well as screening colonoscopy.  He will need to get a flu shot in the fall.    I reviewed the following    Past Medical History:   Diagnosis Date   • Gout, chronic 5/20/2014   • Hypertension 5/20/2014   • Obesity 5/20/2014        Past Surgical History:   Procedure Laterality Date   • HERNIA REPAIR  1988    Ingunial - right side       Allergies   Allergen Reactions   • Nkda [No Known Drug Allergy]        Current Outpatient Medications   Medication Sig Dispense Refill   • allopurinol (ZYLOPRIM) 300  MG Tab Take 1 tablet by mouth every day. 90 tablet 3   • enalapril (VASOTEC) 20 MG tablet Take 1 tablet by mouth every day. 90 tablet 3     No current facility-administered medications for this visit.        Family History   Problem Relation Age of Onset   • Diabetes Mother    • Hypertension Mother    • Hypertension Father    • Hypertension Brother        Social History     Socioeconomic History   • Marital status:      Spouse name: Not on file   • Number of children: 0   • Years of education: Not on file   • Highest education level: Not on file   Occupational History   • Occupation:    Tobacco Use   • Smoking status: Never Smoker   • Smokeless tobacco: Never Used   Substance and Sexual Activity   • Alcohol use: Yes     Alcohol/week: 0.6 oz     Types: 1 Standard drinks or equivalent per week   • Drug use: No   • Sexual activity: Yes     Partners: Female   Other Topics Concern   • Not on file   Social History Narrative   • Not on file     Social Determinants of Health     Financial Resource Strain:    • Difficulty of Paying Living Expenses:    Food Insecurity:    • Worried About Running Out of Food in the Last Year:    • Ran Out of Food in the Last Year:    Transportation Needs:    • Lack of Transportation (Medical):    • Lack of Transportation (Non-Medical):    Physical Activity:    • Days of Exercise per Week:    • Minutes of Exercise per Session:    Stress:    • Feeling of Stress :    Social Connections:    • Frequency of Communication with Friends and Family:    • Frequency of Social Gatherings with Friends and Family:    • Attends [employer] Services:    • Active Member of Clubs or Organizations:    • Attends Club or Organization Meetings:    • Marital Status:    Intimate Partner Violence:    • Fear of Current or Ex-Partner:    • Emotionally Abused:    • Physically Abused:    • Sexually Abused:         ROS     As per HPI, the rest are negative.       Objective:     /80 (BP Location: Left  "arm, Patient Position: Sitting, BP Cuff Size: Adult)   Pulse 86   Temp 36.5 °C (97.7 °F) (Temporal)   Ht 1.753 m (5' 9\")   Wt 119 kg (261 lb 14.5 oz)   SpO2 93%   BMI 38.68 kg/m²      Physical Exam  Vitals and nursing note reviewed.   Constitutional:       General: He is not in acute distress.     Appearance: He is well-developed. He is not diaphoretic.   HENT:      Head: Normocephalic and atraumatic.      Right Ear: Tympanic membrane and external ear normal.      Left Ear: Tympanic membrane and external ear normal.      Ears:      Comments: Positive excessive cerumen both ear canals partially blocking the canal, tympanic membranes partially visible without evidence of infection     Nose: Nose normal.      Mouth/Throat:      Pharynx: No oropharyngeal exudate.   Eyes:      General: No scleral icterus.        Right eye: No discharge.         Left eye: No discharge.      Conjunctiva/sclera: Conjunctivae normal.      Pupils: Pupils are equal, round, and reactive to light.   Neck:      Thyroid: No thyromegaly.      Vascular: No JVD.      Trachea: No tracheal deviation.   Cardiovascular:      Rate and Rhythm: Normal rate and regular rhythm.      Heart sounds: Normal heart sounds. No murmur. No friction rub. No gallop.    Pulmonary:      Effort: Pulmonary effort is normal. No respiratory distress.      Breath sounds: Normal breath sounds. No stridor. No wheezing or rales.   Chest:      Chest wall: No tenderness.   Abdominal:      General: Bowel sounds are normal. There is no distension.      Palpations: Abdomen is soft. There is no mass.      Tenderness: There is no abdominal tenderness. There is no guarding or rebound.      Hernia: No hernia is present.   Musculoskeletal:         General: No tenderness or deformity. Normal range of motion.      Cervical back: Normal range of motion and neck supple.   Lymphadenopathy:      Cervical: No cervical adenopathy.   Skin:     General: Skin is warm and dry.      Coloration: " Skin is not pale.      Findings: No erythema or rash.   Neurological:      Mental Status: He is alert and oriented to person, place, and time.      Cranial Nerves: No cranial nerve deficit.      Motor: No abnormal muscle tone.      Coordination: Coordination normal.      Deep Tendon Reflexes: Reflexes are normal and symmetric. Reflexes normal.   Psychiatric:         Behavior: Behavior normal.         Thought Content: Thought content normal.         Judgment: Judgment normal.                      Assessment/Plan:        1. Essential hypertension  Controlled on enalapril.  Continue medication.  We will do updated blood work.  - Lipid Profile; Future  - Comp Metabolic Panel; Future  - CBC WITH DIFFERENTIAL; Future  - PROSTATE SPECIFIC AG SCREENING; Future  - URIC ACID; Future  - enalapril (VASOTEC) 20 MG tablet; Take 1 tablet by mouth every day.  Dispense: 90 tablet; Refill: 3    2. Dyslipidemia  This is managed with his own efforts.  Has been a while since the last lipid panel.  We will do updated lipid panel.  We will treat depending on 10-year ASCVD risk score.  - Lipid Profile; Future  - Comp Metabolic Panel; Future  - CBC WITH DIFFERENTIAL; Future  - PROSTATE SPECIFIC AG SCREENING; Future  - URIC ACID; Future    3. Chronic gout without tophus, unspecified cause, unspecified site  Has not had an attack in 2 to 3 years.  He is taking allopurinol 300 mg 1 tablet every other day only but he will continue taking this dose and the same frequency.  I will check uric acid level.  - Lipid Profile; Future  - Comp Metabolic Panel; Future  - CBC WITH DIFFERENTIAL; Future  - PROSTATE SPECIFIC AG SCREENING; Future  - URIC ACID; Future  - allopurinol (ZYLOPRIM) 300 MG Tab; Take 1 tablet by mouth every day.  Dispense: 90 tablet; Refill: 3    4. Erectile dysfunction, unspecified erectile dysfunction type  I discussed mechanism of action of Viagra, potential interaction with nitrates, potential side effects.  At this time he said  he will not get the prescription and he will let me know if he will needed in the future.    5. Impacted cerumen, bilateral  Cerumen removed from both ear canals by scooping them out using cerumen curette with good success.  Reexamination showed intact tympanic membranes without evidence of infection.    6. Screening for prostate cancer  Screening PSA was ordered.  - Lipid Profile; Future  - Comp Metabolic Panel; Future  - CBC WITH DIFFERENTIAL; Future  - PROSTATE SPECIFIC AG SCREENING; Future  - URIC ACID; Future    7. Need for Tdap vaccination  He was given Tdap.  - Tdap Vaccine =>6YO IM    Return in 6 months for follow-up.      Please note that this dictation was created using voice recognition software. I have worked with consultants from the vendor as well as technical experts from [facility]  [employer] to optimize the interface. I have made every reasonable attempt to correct obvious errors, but I expect that there are errors of grammar and possibly content I did not discover before finalizing the note.

## 2023-02-02 DIAGNOSIS — I10 ESSENTIAL HYPERTENSION: ICD-10-CM

## 2023-02-02 DIAGNOSIS — M1A.9XX0 CHRONIC GOUT WITHOUT TOPHUS, UNSPECIFIED CAUSE, UNSPECIFIED SITE: ICD-10-CM

## 2023-02-02 RX ORDER — ALLOPURINOL 300 MG/1
300 TABLET ORAL DAILY
Qty: 90 TABLET | Refills: 1 | Status: SHIPPED | OUTPATIENT
Start: 2023-02-02 | End: 2023-05-09 | Stop reason: SDUPTHER

## 2023-02-02 RX ORDER — ENALAPRIL MALEATE 20 MG/1
20 TABLET ORAL DAILY
Qty: 90 TABLET | Refills: 1 | Status: SHIPPED | OUTPATIENT
Start: 2023-02-02 | End: 2023-05-09 | Stop reason: SDUPTHER

## 2023-05-08 SDOH — HEALTH STABILITY: PHYSICAL HEALTH: ON AVERAGE, HOW MANY MINUTES DO YOU ENGAGE IN EXERCISE AT THIS LEVEL?: 10 MIN

## 2023-05-08 SDOH — ECONOMIC STABILITY: TRANSPORTATION INSECURITY
IN THE PAST 12 MONTHS, HAS LACK OF RELIABLE TRANSPORTATION KEPT YOU FROM MEDICAL APPOINTMENTS, MEETINGS, WORK OR FROM GETTING THINGS NEEDED FOR DAILY LIVING?: NO

## 2023-05-08 SDOH — ECONOMIC STABILITY: FOOD INSECURITY: WITHIN THE PAST 12 MONTHS, THE FOOD YOU BOUGHT JUST DIDN'T LAST AND YOU DIDN'T HAVE MONEY TO GET MORE.: NEVER TRUE

## 2023-05-08 SDOH — ECONOMIC STABILITY: INCOME INSECURITY: HOW HARD IS IT FOR YOU TO PAY FOR THE VERY BASICS LIKE FOOD, HOUSING, MEDICAL CARE, AND HEATING?: SOMEWHAT HARD

## 2023-05-08 SDOH — ECONOMIC STABILITY: HOUSING INSECURITY
IN THE LAST 12 MONTHS, WAS THERE A TIME WHEN YOU DID NOT HAVE A STEADY PLACE TO SLEEP OR SLEPT IN A SHELTER (INCLUDING NOW)?: NO

## 2023-05-08 SDOH — ECONOMIC STABILITY: FOOD INSECURITY: WITHIN THE PAST 12 MONTHS, YOU WORRIED THAT YOUR FOOD WOULD RUN OUT BEFORE YOU GOT MONEY TO BUY MORE.: NEVER TRUE

## 2023-05-08 SDOH — ECONOMIC STABILITY: HOUSING INSECURITY: IN THE LAST 12 MONTHS, HOW MANY PLACES HAVE YOU LIVED?: 2

## 2023-05-08 SDOH — ECONOMIC STABILITY: TRANSPORTATION INSECURITY
IN THE PAST 12 MONTHS, HAS THE LACK OF TRANSPORTATION KEPT YOU FROM MEDICAL APPOINTMENTS OR FROM GETTING MEDICATIONS?: NO

## 2023-05-08 SDOH — ECONOMIC STABILITY: INCOME INSECURITY: IN THE LAST 12 MONTHS, WAS THERE A TIME WHEN YOU WERE NOT ABLE TO PAY THE MORTGAGE OR RENT ON TIME?: NO

## 2023-05-08 SDOH — ECONOMIC STABILITY: TRANSPORTATION INSECURITY
IN THE PAST 12 MONTHS, HAS LACK OF TRANSPORTATION KEPT YOU FROM MEETINGS, WORK, OR FROM GETTING THINGS NEEDED FOR DAILY LIVING?: NO

## 2023-05-08 SDOH — HEALTH STABILITY: PHYSICAL HEALTH

## 2023-05-08 SDOH — HEALTH STABILITY: MENTAL HEALTH
STRESS IS WHEN SOMEONE FEELS TENSE, NERVOUS, ANXIOUS, OR CAN'T SLEEP AT NIGHT BECAUSE THEIR MIND IS TROUBLED. HOW STRESSED ARE YOU?: RATHER MUCH

## 2023-05-08 ASSESSMENT — SOCIAL DETERMINANTS OF HEALTH (SDOH)
IN A TYPICAL WEEK, HOW MANY TIMES DO YOU TALK ON THE PHONE WITH FAMILY, FRIENDS, OR NEIGHBORS?: THREE TIMES A WEEK
HOW OFTEN DO YOU HAVE SIX OR MORE DRINKS ON ONE OCCASION: LESS THAN MONTHLY
HOW OFTEN DO YOU HAVE A DRINK CONTAINING ALCOHOL: 2-4 TIMES A MONTH
HOW HARD IS IT FOR YOU TO PAY FOR THE VERY BASICS LIKE FOOD, HOUSING, MEDICAL CARE, AND HEATING?: SOMEWHAT HARD
HOW OFTEN DO YOU ATTEND CHURCH OR RELIGIOUS SERVICES?: NEVER
HOW MANY DRINKS CONTAINING ALCOHOL DO YOU HAVE ON A TYPICAL DAY WHEN YOU ARE DRINKING: 1 OR 2
HOW OFTEN DO YOU ATTEND CHURCH OR RELIGIOUS SERVICES?: NEVER
DO YOU BELONG TO ANY CLUBS OR ORGANIZATIONS SUCH AS CHURCH GROUPS UNIONS, FRATERNAL OR ATHLETIC GROUPS, OR SCHOOL GROUPS?: NO
HOW OFTEN DO YOU GET TOGETHER WITH FRIENDS OR RELATIVES?: ONCE A WEEK
HOW OFTEN DO YOU GET TOGETHER WITH FRIENDS OR RELATIVES?: ONCE A WEEK
HOW OFTEN DO YOU ATTENT MEETINGS OF THE CLUB OR ORGANIZATION YOU BELONG TO?: MORE THAN 4 TIMES PER YEAR
DO YOU BELONG TO ANY CLUBS OR ORGANIZATIONS SUCH AS CHURCH GROUPS UNIONS, FRATERNAL OR ATHLETIC GROUPS, OR SCHOOL GROUPS?: NO
IN A TYPICAL WEEK, HOW MANY TIMES DO YOU TALK ON THE PHONE WITH FAMILY, FRIENDS, OR NEIGHBORS?: THREE TIMES A WEEK
HOW OFTEN DO YOU ATTENT MEETINGS OF THE CLUB OR ORGANIZATION YOU BELONG TO?: MORE THAN 4 TIMES PER YEAR
WITHIN THE PAST 12 MONTHS, YOU WORRIED THAT YOUR FOOD WOULD RUN OUT BEFORE YOU GOT THE MONEY TO BUY MORE: NEVER TRUE

## 2023-05-08 ASSESSMENT — LIFESTYLE VARIABLES
HOW OFTEN DO YOU HAVE A DRINK CONTAINING ALCOHOL: 2-4 TIMES A MONTH
HOW OFTEN DO YOU HAVE SIX OR MORE DRINKS ON ONE OCCASION: LESS THAN MONTHLY
HOW MANY STANDARD DRINKS CONTAINING ALCOHOL DO YOU HAVE ON A TYPICAL DAY: 1 OR 2
SKIP TO QUESTIONS 9-10: 0
AUDIT-C TOTAL SCORE: 3

## 2023-05-09 ENCOUNTER — OFFICE VISIT (OUTPATIENT)
Dept: MEDICAL GROUP | Facility: PHYSICIAN GROUP | Age: 53
End: 2023-05-09
Payer: COMMERCIAL

## 2023-05-09 VITALS
SYSTOLIC BLOOD PRESSURE: 126 MMHG | OXYGEN SATURATION: 95 % | BODY MASS INDEX: 42.06 KG/M2 | HEART RATE: 93 BPM | DIASTOLIC BLOOD PRESSURE: 80 MMHG | HEIGHT: 69 IN | WEIGHT: 284 LBS | TEMPERATURE: 98 F | RESPIRATION RATE: 18 BRPM

## 2023-05-09 DIAGNOSIS — I10 ESSENTIAL HYPERTENSION: ICD-10-CM

## 2023-05-09 DIAGNOSIS — Z13.29 SCREENING FOR THYROID DISORDER: ICD-10-CM

## 2023-05-09 DIAGNOSIS — Z83.3 FAMILY HISTORY OF DIABETES MELLITUS: ICD-10-CM

## 2023-05-09 DIAGNOSIS — Z13.220 ENCOUNTER FOR SCREENING FOR LIPID DISORDER: ICD-10-CM

## 2023-05-09 DIAGNOSIS — M1A.9XX0 CHRONIC GOUT WITHOUT TOPHUS, UNSPECIFIED CAUSE, UNSPECIFIED SITE: ICD-10-CM

## 2023-05-09 DIAGNOSIS — E78.5 DYSLIPIDEMIA: ICD-10-CM

## 2023-05-09 DIAGNOSIS — I10 PRIMARY HYPERTENSION: ICD-10-CM

## 2023-05-09 DIAGNOSIS — N52.2 DRUG-INDUCED ERECTILE DYSFUNCTION: ICD-10-CM

## 2023-05-09 DIAGNOSIS — E55.9 VITAMIN D DEFICIENCY: ICD-10-CM

## 2023-05-09 PROBLEM — K40.90 LEFT INGUINAL HERNIA: Status: RESOLVED | Noted: 2020-06-18 | Resolved: 2023-05-09

## 2023-05-09 PROBLEM — E66.9 OBESITY (BMI 30-39.9): Status: RESOLVED | Noted: 2017-06-22 | Resolved: 2023-05-09

## 2023-05-09 PROBLEM — E66.9 OBESITY (BMI 35.0-39.9 WITHOUT COMORBIDITY): Status: RESOLVED | Noted: 2018-06-19 | Resolved: 2023-05-09

## 2023-05-09 PROCEDURE — 3074F SYST BP LT 130 MM HG: CPT | Performed by: NURSE PRACTITIONER

## 2023-05-09 PROCEDURE — 99214 OFFICE O/P EST MOD 30 MIN: CPT | Performed by: NURSE PRACTITIONER

## 2023-05-09 PROCEDURE — 3079F DIAST BP 80-89 MM HG: CPT | Performed by: NURSE PRACTITIONER

## 2023-05-09 RX ORDER — TADALAFIL 5 MG/1
5 TABLET ORAL
Qty: 30 TABLET | Refills: 1 | Status: SHIPPED | OUTPATIENT
Start: 2023-05-09

## 2023-05-09 RX ORDER — ENALAPRIL MALEATE 20 MG/1
20 TABLET ORAL DAILY
Qty: 90 TABLET | Refills: 3 | Status: SHIPPED | OUTPATIENT
Start: 2023-05-09

## 2023-05-09 RX ORDER — ALLOPURINOL 300 MG/1
300 TABLET ORAL DAILY
Qty: 90 TABLET | Refills: 3 | Status: SHIPPED | OUTPATIENT
Start: 2023-05-09 | End: 2024-01-09

## 2023-05-09 ASSESSMENT — PATIENT HEALTH QUESTIONNAIRE - PHQ9
CLINICAL INTERPRETATION OF PHQ2 SCORE: 1
5. POOR APPETITE OR OVEREATING: 0 - NOT AT ALL
SUM OF ALL RESPONSES TO PHQ QUESTIONS 1-9: 3

## 2023-05-09 NOTE — ASSESSMENT & PLAN NOTE
Chronic condition. Patient is currently on allopurinol 300mg daily.     Continue allopurinol 300mg daily.

## 2023-05-09 NOTE — PROGRESS NOTES
"  Chief Complaint   Patient presents with    Establish Care     New to you                                                                                                                                       HPI:   Demarco presents today with the following.    Problem   Drug-Induced Erectile Dysfunction   Hypertension   Gout, Chronic       Current Outpatient Medications   Medication Sig Dispense Refill    enalapril (VASOTEC) 20 MG tablet Take 1 Tablet by mouth every day. 90 Tablet 3    allopurinol (ZYLOPRIM) 300 MG Tab Take 1 Tablet by mouth every day. 90 Tablet 3    tadalafil (CIALIS) 5 MG tablet Take 1 Tablet by mouth 1 time a day as needed for Erectile Dysfunction. Do not exceed 1 tablet in a 72 hour period. 30 Tablet 1     No current facility-administered medications for this visit.       Allergies as of 05/09/2023 - Reviewed 05/09/2023   Allergen Reaction Noted    Nkda [no known drug allergy]  05/20/2014        ROS:  All systems negative expect as addressed in assessment and plan.     /80 (BP Location: Left arm, Patient Position: Sitting, BP Cuff Size: Adult)   Pulse 93   Temp 36.7 °C (98 °F) (Temporal)   Resp 18   Ht 1.753 m (5' 9\")   Wt (!) 129 kg (284 lb)   SpO2 95%   BMI 41.94 kg/m²       Physical Exam  Vitals reviewed.   Constitutional:       Appearance: Normal appearance.   HENT:      Head: Normocephalic and atraumatic.      Mouth/Throat:      Mouth: Mucous membranes are moist.   Eyes:      Extraocular Movements: Extraocular movements intact.      Conjunctiva/sclera: Conjunctivae normal.   Pulmonary:      Effort: Pulmonary effort is normal.   Musculoskeletal:         General: Normal range of motion.      Cervical back: Normal range of motion.   Skin:     General: Skin is warm and dry.   Neurological:      General: No focal deficit present.      Mental Status: He is alert and oriented to person, place, and time.   Psychiatric:         Mood and Affect: Mood normal.         Behavior: Behavior " normal.         Thought Content: Thought content normal.           Assessment and Plan:  53 y.o. male with the following issues.    1. Essential hypertension  enalapril (VASOTEC) 20 MG tablet      2. Chronic gout without tophus, unspecified cause, unspecified site  allopurinol (ZYLOPRIM) 300 MG Tab    CBC WITHOUT DIFFERENTIAL      3. Dyslipidemia  CBC WITHOUT DIFFERENTIAL      4. Primary hypertension  CBC WITHOUT DIFFERENTIAL      5. Encounter for screening for lipid disorder  Lipid Profile    CBC WITHOUT DIFFERENTIAL      6. Screening for thyroid disorder  FREE THYROXINE    TSH    CBC WITHOUT DIFFERENTIAL      7. Vitamin D deficiency  VITAMIN D,25 HYDROXY (DEFICIENCY)      8. Family history of diabetes mellitus  Comp Metabolic Panel    HEMOGLOBIN A1C      9. Drug-induced erectile dysfunction             Gout, chronic  Chronic condition. Patient is currently on allopurinol 300mg daily.     Continue allopurinol 300mg daily.     Hypertension  Chronic condition. Patient is stable on enalapril 20mg daily. Patient denies headaches, chest pain, or palpitations.     Patient to continue enalapril 20mg daily.       Drug-induced erectile dysfunction  Chronic stable.  Due to patient's blood pressure medication he will occasionally have ED.    We will refill tadalafil 5 mg as needed.      Return in about 6 months (around 11/9/2023) for follow up for labs and HLD.      I have placed the below orders and discussed them with an approved delegating provider.  The MA is performing the below orders under the direction of Dr. Dawkins.    Please note that this dictation was created using voice recognition software. I have worked with consultants from the vendor as well as technical experts from [facility] to optimize the interface. I have made every reasonable attempt to correct obvious errors, but I expect that there are errors of grammar and possibly content that I did not discover before finalizing the note.

## 2023-05-09 NOTE — ASSESSMENT & PLAN NOTE
Chronic condition. Patient is stable on enalapril 20mg daily. Patient denies headaches, chest pain, or palpitations.     Patient to continue enalapril 20mg daily.

## 2023-05-23 NOTE — ASSESSMENT & PLAN NOTE
Chronic stable.  Due to patient's blood pressure medication he will occasionally have ED.    We will refill tadalafil 5 mg as needed.

## 2023-11-07 ENCOUNTER — APPOINTMENT (OUTPATIENT)
Dept: MEDICAL GROUP | Facility: PHYSICIAN GROUP | Age: 53
End: 2023-11-07
Payer: COMMERCIAL

## 2023-12-26 ENCOUNTER — HOSPITAL ENCOUNTER (OUTPATIENT)
Dept: LAB | Facility: MEDICAL CENTER | Age: 53
End: 2023-12-26
Attending: NURSE PRACTITIONER
Payer: COMMERCIAL

## 2023-12-26 DIAGNOSIS — Z13.29 SCREENING FOR THYROID DISORDER: ICD-10-CM

## 2023-12-26 DIAGNOSIS — E55.9 VITAMIN D DEFICIENCY: ICD-10-CM

## 2023-12-26 DIAGNOSIS — Z83.3 FAMILY HISTORY OF DIABETES MELLITUS: ICD-10-CM

## 2023-12-26 DIAGNOSIS — Z13.220 ENCOUNTER FOR SCREENING FOR LIPID DISORDER: ICD-10-CM

## 2023-12-26 LAB
25(OH)D3 SERPL-MCNC: 15 NG/ML (ref 30–100)
ALBUMIN SERPL BCP-MCNC: 4.1 G/DL (ref 3.2–4.9)
ALBUMIN/GLOB SERPL: 1.5 G/DL
ALP SERPL-CCNC: 79 U/L (ref 30–99)
ALT SERPL-CCNC: 60 U/L (ref 2–50)
ANION GAP SERPL CALC-SCNC: 14 MMOL/L (ref 7–16)
AST SERPL-CCNC: 34 U/L (ref 12–45)
BILIRUB SERPL-MCNC: 0.4 MG/DL (ref 0.1–1.5)
BUN SERPL-MCNC: 14 MG/DL (ref 8–22)
CALCIUM ALBUM COR SERPL-MCNC: 8.9 MG/DL (ref 8.5–10.5)
CALCIUM SERPL-MCNC: 9 MG/DL (ref 8.5–10.5)
CHLORIDE SERPL-SCNC: 98 MMOL/L (ref 96–112)
CHOLEST SERPL-MCNC: 227 MG/DL (ref 100–199)
CO2 SERPL-SCNC: 24 MMOL/L (ref 20–33)
CREAT SERPL-MCNC: 0.9 MG/DL (ref 0.5–1.4)
EST. AVERAGE GLUCOSE BLD GHB EST-MCNC: 361 MG/DL
GFR SERPLBLD CREATININE-BSD FMLA CKD-EPI: 102 ML/MIN/1.73 M 2
GLOBULIN SER CALC-MCNC: 2.8 G/DL (ref 1.9–3.5)
GLUCOSE SERPL-MCNC: 424 MG/DL (ref 65–99)
HBA1C MFR BLD: 14.2 % (ref 4–5.6)
HDLC SERPL-MCNC: 27 MG/DL
LDLC SERPL CALC-MCNC: ABNORMAL MG/DL
POTASSIUM SERPL-SCNC: 4.3 MMOL/L (ref 3.6–5.5)
PROT SERPL-MCNC: 6.9 G/DL (ref 6–8.2)
SODIUM SERPL-SCNC: 136 MMOL/L (ref 135–145)
T4 FREE SERPL-MCNC: 1.34 NG/DL (ref 0.93–1.7)
TRIGL SERPL-MCNC: 575 MG/DL (ref 0–149)
TSH SERPL DL<=0.005 MIU/L-ACNC: 2.44 UIU/ML (ref 0.38–5.33)

## 2023-12-26 PROCEDURE — 80061 LIPID PANEL: CPT

## 2023-12-26 PROCEDURE — 84443 ASSAY THYROID STIM HORMONE: CPT

## 2023-12-26 PROCEDURE — 36415 COLL VENOUS BLD VENIPUNCTURE: CPT

## 2023-12-26 PROCEDURE — 82306 VITAMIN D 25 HYDROXY: CPT

## 2023-12-26 PROCEDURE — 83036 HEMOGLOBIN GLYCOSYLATED A1C: CPT

## 2023-12-26 PROCEDURE — 80053 COMPREHEN METABOLIC PANEL: CPT

## 2023-12-26 PROCEDURE — 84439 ASSAY OF FREE THYROXINE: CPT

## 2023-12-31 ENCOUNTER — OFFICE VISIT (OUTPATIENT)
Dept: URGENT CARE | Facility: CLINIC | Age: 53
End: 2023-12-31
Payer: COMMERCIAL

## 2023-12-31 VITALS
RESPIRATION RATE: 14 BRPM | SYSTOLIC BLOOD PRESSURE: 130 MMHG | DIASTOLIC BLOOD PRESSURE: 64 MMHG | HEIGHT: 69 IN | BODY MASS INDEX: 39.55 KG/M2 | HEART RATE: 82 BPM | OXYGEN SATURATION: 97 % | WEIGHT: 267 LBS | TEMPERATURE: 97.4 F

## 2023-12-31 DIAGNOSIS — R05.1 ACUTE COUGH: ICD-10-CM

## 2023-12-31 DIAGNOSIS — H10.33 ACUTE BACTERIAL CONJUNCTIVITIS OF BOTH EYES: ICD-10-CM

## 2023-12-31 PROCEDURE — 3075F SYST BP GE 130 - 139MM HG: CPT | Performed by: PHYSICIAN ASSISTANT

## 2023-12-31 PROCEDURE — 3078F DIAST BP <80 MM HG: CPT | Performed by: PHYSICIAN ASSISTANT

## 2023-12-31 PROCEDURE — 99213 OFFICE O/P EST LOW 20 MIN: CPT | Performed by: PHYSICIAN ASSISTANT

## 2023-12-31 RX ORDER — MOXIFLOXACIN 5 MG/ML
1 SOLUTION/ DROPS OPHTHALMIC 3 TIMES DAILY
Qty: 2 ML | Refills: 0 | Status: SHIPPED | OUTPATIENT
Start: 2023-12-31 | End: 2024-01-07

## 2023-12-31 RX ORDER — DEXTROMETHORPHAN HYDROBROMIDE AND PROMETHAZINE HYDROCHLORIDE 15; 6.25 MG/5ML; MG/5ML
5 SYRUP ORAL EVERY 6 HOURS PRN
Qty: 118 ML | Refills: 0 | Status: SHIPPED | OUTPATIENT
Start: 2023-12-31 | End: 2024-01-07

## 2023-12-31 ASSESSMENT — ENCOUNTER SYMPTOMS
VOMITING: 0
PHOTOPHOBIA: 0
NAUSEA: 0
SORE THROAT: 0
EYE DISCHARGE: 1
CONSTIPATION: 0
EYE PAIN: 0
DIARRHEA: 0
HEADACHES: 0
COUGH: 1
MYALGIAS: 0
EYE REDNESS: 1
BLURRED VISION: 0
ABDOMINAL PAIN: 0
SHORTNESS OF BREATH: 0
DOUBLE VISION: 0
CHILLS: 0
FEVER: 0

## 2023-12-31 NOTE — PROGRESS NOTES
"Subjective:   Demarco Hickey is a 53 y.o. male who presents for Eye Problem (Possible pink eye on both eyes and started yesterday ), Runny Nose, and Nasal Congestion      53-year-old male has had a cough for the last 2 or 3 weeks, the cough been worse the last 48 hours and he also noted yesterday while he was flying bilateral eye itchiness and irritation that resulted in more redness as well as tear production and discharge today.  No ocular trauma.  He does not wear contact lenses or glasses.  He denies any changes to visual acuities.  He has no history of asthma or COPD.    Review of Systems   Constitutional:  Negative for chills and fever.   HENT:  Positive for congestion. Negative for ear pain and sore throat.    Eyes:  Positive for discharge and redness. Negative for blurred vision, double vision, photophobia and pain.   Respiratory:  Positive for cough. Negative for shortness of breath.    Cardiovascular:  Negative for chest pain.   Gastrointestinal:  Negative for abdominal pain, constipation, diarrhea, nausea and vomiting.   Genitourinary:  Negative for dysuria.   Musculoskeletal:  Negative for myalgias.   Skin:  Negative for rash.   Neurological:  Negative for headaches.       Medications, Allergies, and current problem list reviewed today in Epic.     Objective:     /64 (BP Location: Left arm, Patient Position: Sitting)   Pulse 82   Temp 36.3 °C (97.4 °F) (Temporal)   Resp 14   Ht 1.753 m (5' 9\")   Wt 121 kg (267 lb)   SpO2 97%     Physical Exam  Vitals reviewed.   Constitutional:       Appearance: Normal appearance. He is not toxic-appearing.   HENT:      Head: Normocephalic and atraumatic.      Right Ear: External ear normal.      Left Ear: External ear normal.      Nose: Nose normal.      Mouth/Throat:      Mouth: Mucous membranes are moist.   Eyes:      General:         Right eye: Discharge present.         Left eye: Discharge present.     Extraocular Movements: Extraocular movements " intact.      Pupils: Pupils are equal, round, and reactive to light.      Comments: Injected conjunctiva bilaterally   Cardiovascular:      Rate and Rhythm: Normal rate and regular rhythm.   Pulmonary:      Effort: Pulmonary effort is normal.      Breath sounds: Normal breath sounds.   Lymphadenopathy:      Cervical: No cervical adenopathy.   Skin:     General: Skin is warm and dry.      Capillary Refill: Capillary refill takes less than 2 seconds.   Neurological:      Mental Status: He is alert and oriented to person, place, and time.         Assessment/Plan:     Diagnosis and associated orders:     1. Acute bacterial conjunctivitis of both eyes  moxifloxacin (VIGAMOX) 0.5 % Solution      2. Acute cough  promethazine-dextromethorphan (PROMETHAZINE-DM) 6.25-15 MG/5ML syrup         Comments/MDM:     Patient with postviral cough syndrome likely worsening due to increased drainage to the nasolacrimal duct secondary to pinkeye.  Moxifloxacin drops sent to pharmacy.  No sign of acute red eye pathology or vision threatening emergency.  Consider follow-up if cough is worsening.         Differential diagnosis, natural history, supportive care, and indications for immediate follow-up discussed.    Advised the patient to follow-up with the primary care physician for recheck, reevaluation, and consideration of further management.    Please note that this dictation was created using voice recognition software. I have made a reasonable attempt to correct obvious errors, but I expect that there are errors of grammar and possibly content that I did not discover before finalizing the note.    This note was electronically signed by Paras Feliciano PA-C

## 2024-01-09 ENCOUNTER — OFFICE VISIT (OUTPATIENT)
Dept: MEDICAL GROUP | Facility: PHYSICIAN GROUP | Age: 54
End: 2024-01-09
Payer: COMMERCIAL

## 2024-01-09 VITALS
TEMPERATURE: 97.4 F | BODY MASS INDEX: 39.1 KG/M2 | RESPIRATION RATE: 16 BRPM | HEART RATE: 91 BPM | WEIGHT: 264 LBS | OXYGEN SATURATION: 94 % | SYSTOLIC BLOOD PRESSURE: 112 MMHG | HEIGHT: 69 IN | DIASTOLIC BLOOD PRESSURE: 70 MMHG

## 2024-01-09 DIAGNOSIS — M1A.9XX0 CHRONIC GOUT WITHOUT TOPHUS, UNSPECIFIED CAUSE, UNSPECIFIED SITE: ICD-10-CM

## 2024-01-09 DIAGNOSIS — Z12.12 ENCOUNTER FOR SCREENING FOR COLORECTAL MALIGNANT NEOPLASM: ICD-10-CM

## 2024-01-09 DIAGNOSIS — E11.59 HYPERTENSION ASSOCIATED WITH TYPE 2 DIABETES MELLITUS (HCC): ICD-10-CM

## 2024-01-09 DIAGNOSIS — B34.9 ACUTE VIRAL SYNDROME: ICD-10-CM

## 2024-01-09 DIAGNOSIS — E11.69 HYPERLIPIDEMIA DUE TO TYPE 2 DIABETES MELLITUS (HCC): ICD-10-CM

## 2024-01-09 DIAGNOSIS — E78.5 HYPERLIPIDEMIA DUE TO TYPE 2 DIABETES MELLITUS (HCC): ICD-10-CM

## 2024-01-09 DIAGNOSIS — I15.2 HYPERTENSION ASSOCIATED WITH TYPE 2 DIABETES MELLITUS (HCC): ICD-10-CM

## 2024-01-09 DIAGNOSIS — E11.69 TYPE 2 DIABETES MELLITUS WITH OTHER SPECIFIED COMPLICATION, WITHOUT LONG-TERM CURRENT USE OF INSULIN (HCC): ICD-10-CM

## 2024-01-09 DIAGNOSIS — Z12.11 ENCOUNTER FOR SCREENING FOR COLORECTAL MALIGNANT NEOPLASM: ICD-10-CM

## 2024-01-09 PROBLEM — E11.9 TYPE 2 DIABETES MELLITUS, WITHOUT LONG-TERM CURRENT USE OF INSULIN (HCC): Status: ACTIVE | Noted: 2024-01-09

## 2024-01-09 PROCEDURE — 99215 OFFICE O/P EST HI 40 MIN: CPT | Performed by: NURSE PRACTITIONER

## 2024-01-09 PROCEDURE — 3074F SYST BP LT 130 MM HG: CPT | Performed by: NURSE PRACTITIONER

## 2024-01-09 PROCEDURE — 3078F DIAST BP <80 MM HG: CPT | Performed by: NURSE PRACTITIONER

## 2024-01-09 RX ORDER — METFORMIN HYDROCHLORIDE 500 MG/1
500 TABLET, EXTENDED RELEASE ORAL 2 TIMES DAILY
Qty: 180 TABLET | Refills: 1 | Status: SHIPPED | OUTPATIENT
Start: 2024-01-09

## 2024-01-09 RX ORDER — ALLOPURINOL 100 MG/1
100 TABLET ORAL DAILY
Qty: 90 TABLET | Refills: 1 | Status: SHIPPED | OUTPATIENT
Start: 2024-01-09

## 2024-01-09 RX ORDER — ATORVASTATIN CALCIUM 10 MG/1
10 TABLET, FILM COATED ORAL NIGHTLY
Qty: 90 TABLET | Refills: 3 | Status: SHIPPED | OUTPATIENT
Start: 2024-01-09

## 2024-01-09 ASSESSMENT — PATIENT HEALTH QUESTIONNAIRE - PHQ9: CLINICAL INTERPRETATION OF PHQ2 SCORE: 0

## 2024-01-09 NOTE — ASSESSMENT & PLAN NOTE
Chronic unstable.     Reviewed labs with patient.     Lab Results   Component Value Date/Time    CHOLSTRLTOT 227 (H) 12/26/2023 07:45 AM    TRIGLYCERIDE 575 (H) 12/26/2023 07:45 AM    HDL 27 (A) 12/26/2023 07:45 AM    LDL see below 12/26/2023 07:45 AM     Start atorvastatin 10mg daily.

## 2024-01-09 NOTE — PROGRESS NOTES
"  Chief Complaint   Patient presents with    Lab Results                                                                                                                                       HPI:   Demarco presents today with the following.    Problem   Type 2 Diabetes Mellitus, Without Long-Term Current Use of Insulin (Hcc)   Acute Viral Syndrome   Hypertension Associated With Type 2 Diabetes Mellitus (Hcc)   Gout, Chronic   Hyperlipidemia Due to Type 2 Diabetes Mellitus (Hcc)       Current Outpatient Medications   Medication Sig Dispense Refill    metFORMIN ER (GLUCOPHAGE XR) 500 MG TABLET SR 24 HR Take 1 Tablet by mouth 2 times a day. 180 Tablet 1    atorvastatin (LIPITOR) 10 MG Tab Take 1 Tablet by mouth every evening. 90 Tablet 3    allopurinol (ZYLOPRIM) 100 MG Tab Take 1 Tablet by mouth every day. 90 Tablet 1    enalapril (VASOTEC) 20 MG tablet Take 1 Tablet by mouth every day. 90 Tablet 3    tadalafil (CIALIS) 5 MG tablet Take 1 Tablet by mouth 1 time a day as needed for Erectile Dysfunction. Do not exceed 1 tablet in a 72 hour period. 30 Tablet 1     No current facility-administered medications for this visit.       Allergies as of 01/09/2024 - Reviewed 01/09/2024   Allergen Reaction Noted    Nkda [no known drug allergy]  05/20/2014        ROS:  All systems negative expect as addressed in assessment and plan.     /70 (BP Location: Left arm, Patient Position: Sitting)   Pulse 91   Temp 36.3 °C (97.4 °F) (Temporal)   Resp 16   Ht 1.753 m (5' 9\")   Wt 120 kg (264 lb)   SpO2 94%   BMI 38.99 kg/m²       Physical Exam  Vitals reviewed.   Constitutional:       Appearance: Normal appearance.   HENT:      Head: Normocephalic and atraumatic.      Mouth/Throat:      Mouth: Mucous membranes are moist.   Eyes:      Extraocular Movements: Extraocular movements intact.      Conjunctiva/sclera: Conjunctivae normal.   Pulmonary:      Effort: Pulmonary effort is normal.   Musculoskeletal:         General: Normal " range of motion.      Cervical back: Normal range of motion.   Skin:     General: Skin is warm and dry.   Neurological:      General: No focal deficit present.      Mental Status: He is alert and oriented to person, place, and time.   Psychiatric:         Mood and Affect: Mood normal.         Behavior: Behavior normal.         Thought Content: Thought content normal.           Assessment and Plan:  53 y.o. male with the following issues.    1. Encounter for screening for colorectal malignant neoplasm  COLOGUARD (FIT DNA)      2. Type 2 diabetes mellitus with other specified complication, without long-term current use of insulin (HCC)  metFORMIN ER (GLUCOPHAGE XR) 500 MG TABLET SR 24 HR      3. Hyperlipidemia due to type 2 diabetes mellitus (HCC)        4. Acute viral syndrome        5. Hypertension associated with type 2 diabetes mellitus (HCC)        6. Chronic gout without tophus, unspecified cause, unspecified site             Hyperlipidemia due to type 2 diabetes mellitus (HCC)  Chronic unstable.     Reviewed labs with patient.     Lab Results   Component Value Date/Time    CHOLSTRLTOT 227 (H) 12/26/2023 07:45 AM    TRIGLYCERIDE 575 (H) 12/26/2023 07:45 AM    HDL 27 (A) 12/26/2023 07:45 AM    LDL see below 12/26/2023 07:45 AM     Start atorvastatin 10mg daily.     Acute viral syndrome  Patient reports developing an acute viral illness.  He states that he started feeling sick after traveling back from [place] around New Year's.  He reports that he has been feeling better and the illness is resolving.  He did report bilateral conjunctivitis as well as a rash on his bilateral lower extremities.    Discussed with patient that it is possible she had COVID due to the conjunctivitis and rash.    Type 2 diabetes mellitus, without long-term current use of insulin (HCC)  This is a new diagnosis.    Patient does have a family history of diabetes in both his mom side as well as his dad side of the family.    In the past  patient has not had any blood sugar regulation issues and or even sign of prediabetes, however, it has been several years since he has had full lab work done.  His most current labs do show an A1c greater than 14% as well as a fasting blood sugar of 400.     Latest Reference Range & Units 12/26/23 07:45   Glycohemoglobin 4.0 - 5.6 % 14.2 (H)   Estim. Avg Glu mg/dL 361   (H): Data is abnormally high    Discussed with patient that as he did get these labs done while he was acutely ill that may have elevated his sugars even more.    At this time we will start metformin  mg twice daily.  Patient to follow-up in 3 months for A1c check.  At that time if A1c remains greater than 8 we will consider additional medication, either an SLG T2 and/or a GLP-1.    Hypertension associated with type 2 diabetes mellitus (HCC)  Chronic stable.    Continue enalapril 20 mg daily.    Gout, chronic  Chronic stable.    Patient reports that he has not been taking his allopurinol as consistently as he has not had any issues with gout.  He reports it has been several months since he had a gout flare.  Even then, he reports that his gout is easily manageable.    At this time we will decrease patient's allopurinol from 300 mg to 100 mg daily.  If patient feels that his gout is well-controlled and wants to stop the allopurinol discussed that this is appropriate.  If he has recurring gout when stopping the allopurinol we will consider restarting it.      Return in about 3 months (around 4/1/2024) for Diabetes, HTN, High cholesterol.    I spent a total of 40 minutes with record review, exam, and communication with the patient, communication with other providers, and documentation of this encounter. This does not include time spent on separately billable procedures/tests.    Please note that this dictation was created using voice recognition software. I have worked with consultants from the vendor as well as technical experts from [facility]  [employer] to optimize the interface. I have made every reasonable attempt to correct obvious errors, but I expect that there are errors of grammar and possibly content that I did not discover before finalizing the note.

## 2024-01-09 NOTE — ASSESSMENT & PLAN NOTE
This is a new diagnosis.    Patient does have a family history of diabetes in both his mom side as well as his dad side of the family.    In the past patient has not had any blood sugar regulation issues and or even sign of prediabetes, however, it has been several years since he has had full lab work done.  His most current labs do show an A1c greater than 14% as well as a fasting blood sugar of 400.     Latest Reference Range & Units 12/26/23 07:45   Glycohemoglobin 4.0 - 5.6 % 14.2 (H)   Estim. Avg Glu mg/dL 361   (H): Data is abnormally high    Discussed with patient that as he did get these labs done while he was acutely ill that may have elevated his sugars even more.    At this time we will start metformin  mg twice daily.  Patient to follow-up in 3 months for A1c check.  At that time if A1c remains greater than 8 we will consider additional medication, either an SLG T2 and/or a GLP-1.

## 2024-01-09 NOTE — ASSESSMENT & PLAN NOTE
Chronic stable.    Patient reports that he has not been taking his allopurinol as consistently as he has not had any issues with gout.  He reports it has been several months since he had a gout flare.  Even then, he reports that his gout is easily manageable.    At this time we will decrease patient's allopurinol from 300 mg to 100 mg daily.  If patient feels that his gout is well-controlled and wants to stop the allopurinol discussed that this is appropriate.  If he has recurring gout when stopping the allopurinol we will consider restarting it.

## 2024-01-09 NOTE — ASSESSMENT & PLAN NOTE
Patient reports developing an acute viral illness.  He states that he started feeling sick after traveling back from [place] around New Year's.  He reports that he has been feeling better and the illness is resolving.  He did report bilateral conjunctivitis as well as a rash on his bilateral lower extremities.    Discussed with patient that it is possible she had COVID due to the conjunctivitis and rash.

## 2024-01-09 NOTE — ASSESSMENT & PLAN NOTE
Chronic stable.    Continue enalapril 20 mg daily.   Tazorac Pregnancy And Lactation Text: This medication is not safe during pregnancy. It is unknown if this medication is excreted in breast milk.

## 2024-04-09 ENCOUNTER — OFFICE VISIT (OUTPATIENT)
Dept: MEDICAL GROUP | Facility: PHYSICIAN GROUP | Age: 54
End: 2024-04-09
Payer: COMMERCIAL

## 2024-04-09 VITALS
HEART RATE: 84 BPM | OXYGEN SATURATION: 94 % | TEMPERATURE: 97.3 F | RESPIRATION RATE: 16 BRPM | BODY MASS INDEX: 38.51 KG/M2 | DIASTOLIC BLOOD PRESSURE: 76 MMHG | SYSTOLIC BLOOD PRESSURE: 132 MMHG | WEIGHT: 260 LBS | HEIGHT: 69 IN

## 2024-04-09 DIAGNOSIS — E55.9 VITAMIN D DEFICIENCY: ICD-10-CM

## 2024-04-09 DIAGNOSIS — E78.5 HYPERLIPIDEMIA DUE TO TYPE 2 DIABETES MELLITUS (HCC): ICD-10-CM

## 2024-04-09 DIAGNOSIS — E11.69 HYPERLIPIDEMIA DUE TO TYPE 2 DIABETES MELLITUS (HCC): ICD-10-CM

## 2024-04-09 DIAGNOSIS — E11.69 TYPE 2 DIABETES MELLITUS WITH OTHER SPECIFIED COMPLICATION, WITHOUT LONG-TERM CURRENT USE OF INSULIN (HCC): ICD-10-CM

## 2024-04-09 DIAGNOSIS — I15.2 HYPERTENSION ASSOCIATED WITH TYPE 2 DIABETES MELLITUS (HCC): ICD-10-CM

## 2024-04-09 DIAGNOSIS — E11.59 HYPERTENSION ASSOCIATED WITH TYPE 2 DIABETES MELLITUS (HCC): ICD-10-CM

## 2024-04-09 DIAGNOSIS — M1A.9XX0 CHRONIC GOUT WITHOUT TOPHUS, UNSPECIFIED CAUSE, UNSPECIFIED SITE: ICD-10-CM

## 2024-04-09 DIAGNOSIS — N52.2 DRUG-INDUCED ERECTILE DYSFUNCTION: ICD-10-CM

## 2024-04-09 PROCEDURE — 3078F DIAST BP <80 MM HG: CPT | Performed by: NURSE PRACTITIONER

## 2024-04-09 PROCEDURE — 3075F SYST BP GE 130 - 139MM HG: CPT | Performed by: NURSE PRACTITIONER

## 2024-04-09 PROCEDURE — 99214 OFFICE O/P EST MOD 30 MIN: CPT | Performed by: NURSE PRACTITIONER

## 2024-04-09 NOTE — PROGRESS NOTES
Chief Complaint   Patient presents with    Diabetes     3 month Follow up, did not do labs                                                                                                                                        HPI:   Demarco presents today with the following.    The patient attended the consultation today to provide an update on their current health status and to discuss concerns about new medications and lifestyle adjustments.    The chief complaint was not explicitly stated; however, the patient reports overall well-being since the last visit and has made significant lifestyle changes, such as eliminating sugar from their diet. The patient has recently commenced treatment with Metformin and has expressed concerns regarding potential kidney damage, considering his father's history of kidney issues. The patient acknowledges not monitoring blood glucose levels at home.    The patient experiences occasional leg pain associated with prolonged standing. They have completed a high-dose vitamin D regimen and have continued with a daily vitamin D supplement, although the exact dosage is uncertain. The patient notes an improvement in general well-being and sleep quality following the passing of their mother.    Regarding medication, the patient has been using Cialis intermittently and has questions about its efficacy and potential drug interactions. The patient is considering a dosage adjustment and seeks medical advice on this matter.    The patient also provided information on dietary habits, mentioning fruit and fiber consumption, and described their physical activity routine, which includes brisk walking.    The patient's current health management strategies, concerns about medication effects and interactions, and lifestyle changes were discussed during the consultation.    ROS:  All systems negative expect as addressed in assessment and plan.     /76 (BP Location: Left arm, Patient Position:  "Sitting, BP Cuff Size: Adult)   Pulse 84   Temp 36.3 °C (97.3 °F) (Temporal)   Resp 16   Ht 1.753 m (5' 9\")   Wt 118 kg (260 lb)   SpO2 94%   BMI 38.40 kg/m²     Objective:    Physical Exam  Vitals reviewed.   Constitutional:       Appearance: Normal appearance.   HENT:      Head: Normocephalic and atraumatic.      Mouth/Throat:      Mouth: Mucous membranes are moist.   Eyes:      Extraocular Movements: Extraocular movements intact.      Conjunctiva/sclera: Conjunctivae normal.   Pulmonary:      Effort: Pulmonary effort is normal.   Musculoskeletal:         General: Normal range of motion.      Cervical back: Normal range of motion.   Skin:     General: Skin is warm and dry.   Neurological:      General: No focal deficit present.      Mental Status: He is alert and oriented to person, place, and time.   Psychiatric:         Mood and Affect: Mood normal.         Behavior: Behavior normal.         Thought Content: Thought content normal.         Diagnostic Test Results:  - Laboratory Tests:    - Kidney Function: Normal creatinine levels indicate normal kidney function. Filtration rate mentioned as 102, above the normal threshold of 90, indicating no kidney disease.    - Blood Sugar Levels: Previous records from December show high blood sugar levels.    - Lipid Profile: High triglycerides noted, attributed to elevated blood sugar levels.    - Vitamin Levels: Vitamin D level previously at 15, below the normal range of 30 to 100, indicating deficiency.      Assessment and Plan:  53 y.o. male with the following issues.    1. Hypertension associated with type 2 diabetes mellitus (HCC)  - MICROALBUMIN CREAT RATIO URINE; Future  - Basic Metabolic Panel; Future    2. Type 2 diabetes mellitus with other specified complication, without long-term current use of insulin (HCC)  - Referral to Ophthalmology  - MICROALBUMIN CREAT RATIO URINE; Future  - HEMOGLOBIN A1C; Future  - Basic Metabolic Panel; Future    3. " Hyperlipidemia due to type 2 diabetes mellitus (HCC)  - Lipid Profile; Future    4. Chronic gout without tophus, unspecified cause, unspecified site  - Basic Metabolic Panel; Future    5. Drug-induced erectile dysfunction    6. Vitamin D deficiency  - VITAMIN D,25 HYDROXY (DEFICIENCY); Future      1. Type 2 Diabetes Mellitus Management:     - Plan: Continue current Metformin regimen. Schedule to recheck A1C and other relevant labs in 3 months. Consider the addition of Farxiga or Jardiance if A1C remains above 8.    2. Hyperlipidemia Monitoring:     - Plan: Recheck lipid panel in 3 months to evaluate the impact of glycemic control on lipid levels.    3. Vitamin D Deficiency Treatment:     - Plan: Confirm that the patient's daily Vitamin D supplement contains at least 5,000 units. Plan to recheck Vitamin D levels in 3 months.    4. Leg Pain Assessment:     - Plan: Observe the patient's leg pain symptoms and evaluate further if the condition worsens or becomes chronic.    5. Erectile Dysfunction Management:     - Plan: Increase Cialis dosage to 10 mg as needed every 72 hours. Monitor patient for any side effects and consider alternative treatments if necessary.    6. Gout Dietary and Medication Guidance:     - Plan: Advise the patient to avoid foods high in purines and to follow a balanced diet. Aleve may be used as needed for acute gout episodes.    7. Fiber Supplementation Options:     - Plan: Discuss with the patient the use of sugar-free Metamucil or Benefiber as alternative fiber supplements.    8. Ophthalmology Referral for Diabetic Retinopathy Screening:     - Plan: Refer the patient for a comprehensive eye examination with an ophthalmologist.    9. Follow-up Appointment Scheduling:     - Plan: Arrange a follow-up visit in 3 months to assess the patient's treatment progress and review lab results.    Return in about 3 months (around 7/9/2024) for Diabetes, High cholesterol, HTN, Lab Review.      Please note that  this dictation was created using voice recognition software. I have worked with consultants from the vendor as well as technical experts from [facility] to optimize the interface. I have made every reasonable attempt to correct obvious errors, but I expect that there are errors of grammar and possibly content that I did not discover before finalizing the note.

## 2024-05-05 DIAGNOSIS — I10 ESSENTIAL HYPERTENSION: ICD-10-CM

## 2024-05-09 NOTE — TELEPHONE ENCOUNTER
Received request via: Pharmacy    Was the patient seen in the last year in this department? Yes    Does the patient have an active prescription (recently filled or refills available) for medication(s) requested? No    Pharmacy Name: [facility]     Does the patient have [facility]FP Plus and need 100 day supply (blood pressure, diabetes and cholesterol meds only)? Patient does not have SCP

## 2024-05-10 RX ORDER — ENALAPRIL MALEATE 20 MG/1
20 TABLET ORAL DAILY
Qty: 90 TABLET | Refills: 3 | Status: SHIPPED | OUTPATIENT
Start: 2024-05-10

## 2024-06-05 DIAGNOSIS — E11.69 TYPE 2 DIABETES MELLITUS WITH OTHER SPECIFIED COMPLICATION, WITHOUT LONG-TERM CURRENT USE OF INSULIN (HCC): ICD-10-CM

## 2024-06-07 ENCOUNTER — TELEPHONE (OUTPATIENT)
Dept: HEALTH INFORMATION MANAGEMENT | Facility: OTHER | Age: 54
End: 2024-06-07
Payer: COMMERCIAL

## 2024-07-09 ENCOUNTER — OFFICE VISIT (OUTPATIENT)
Dept: MEDICAL GROUP | Facility: PHYSICIAN GROUP | Age: 54
End: 2024-07-09
Payer: COMMERCIAL

## 2024-07-09 VITALS
RESPIRATION RATE: 16 BRPM | DIASTOLIC BLOOD PRESSURE: 80 MMHG | HEART RATE: 81 BPM | BODY MASS INDEX: 39.4 KG/M2 | SYSTOLIC BLOOD PRESSURE: 118 MMHG | TEMPERATURE: 97.3 F | OXYGEN SATURATION: 93 % | HEIGHT: 69 IN | WEIGHT: 266 LBS

## 2024-07-09 DIAGNOSIS — N52.2 DRUG-INDUCED ERECTILE DYSFUNCTION: ICD-10-CM

## 2024-07-09 DIAGNOSIS — E78.5 HYPERLIPIDEMIA DUE TO TYPE 2 DIABETES MELLITUS (HCC): ICD-10-CM

## 2024-07-09 DIAGNOSIS — M1A.9XX0 CHRONIC GOUT WITHOUT TOPHUS, UNSPECIFIED CAUSE, UNSPECIFIED SITE: ICD-10-CM

## 2024-07-09 DIAGNOSIS — I15.2 HYPERTENSION ASSOCIATED WITH TYPE 2 DIABETES MELLITUS (HCC): ICD-10-CM

## 2024-07-09 DIAGNOSIS — E11.69 TYPE 2 DIABETES MELLITUS WITH OTHER SPECIFIED COMPLICATION, WITHOUT LONG-TERM CURRENT USE OF INSULIN (HCC): ICD-10-CM

## 2024-07-09 DIAGNOSIS — E11.69 HYPERLIPIDEMIA DUE TO TYPE 2 DIABETES MELLITUS (HCC): ICD-10-CM

## 2024-07-09 DIAGNOSIS — E11.59 HYPERTENSION ASSOCIATED WITH TYPE 2 DIABETES MELLITUS (HCC): ICD-10-CM

## 2024-07-09 LAB
HBA1C MFR BLD: 6.1 % (ref ?–5.8)
POCT INT CON NEG: NEGATIVE
POCT INT CON POS: POSITIVE

## 2024-07-09 PROCEDURE — 99214 OFFICE O/P EST MOD 30 MIN: CPT | Performed by: NURSE PRACTITIONER

## 2024-07-09 PROCEDURE — 83036 HEMOGLOBIN GLYCOSYLATED A1C: CPT | Performed by: NURSE PRACTITIONER

## 2024-07-09 PROCEDURE — 3074F SYST BP LT 130 MM HG: CPT | Performed by: NURSE PRACTITIONER

## 2024-07-09 PROCEDURE — 3079F DIAST BP 80-89 MM HG: CPT | Performed by: NURSE PRACTITIONER

## 2024-07-09 RX ORDER — ALLOPURINOL 100 MG/1
100 TABLET ORAL DAILY
Qty: 14 TABLET | Refills: 0 | Status: SHIPPED | OUTPATIENT
Start: 2024-07-09

## 2024-07-09 RX ORDER — METFORMIN HYDROCHLORIDE 500 MG/1
1000 TABLET, EXTENDED RELEASE ORAL DAILY
Qty: 14 TABLET | Refills: 0 | Status: SHIPPED
Start: 2024-07-09 | End: 2024-07-30

## 2024-07-09 RX ORDER — ATORVASTATIN CALCIUM 10 MG/1
10 TABLET, FILM COATED ORAL NIGHTLY
Qty: 14 TABLET | Refills: 0 | Status: SHIPPED | OUTPATIENT
Start: 2024-07-09

## 2024-07-09 RX ORDER — SILDENAFIL 100 MG/1
100 TABLET, FILM COATED ORAL
Qty: 30 TABLET | Refills: 3 | Status: SHIPPED | OUTPATIENT
Start: 2024-07-09

## 2024-07-26 ENCOUNTER — OFFICE VISIT (OUTPATIENT)
Dept: URGENT CARE | Facility: PHYSICIAN GROUP | Age: 54
End: 2024-07-26
Payer: COMMERCIAL

## 2024-07-26 VITALS
SYSTOLIC BLOOD PRESSURE: 112 MMHG | HEIGHT: 69 IN | WEIGHT: 264 LBS | OXYGEN SATURATION: 100 % | TEMPERATURE: 98.8 F | DIASTOLIC BLOOD PRESSURE: 74 MMHG | BODY MASS INDEX: 39.1 KG/M2 | RESPIRATION RATE: 18 BRPM | HEART RATE: 94 BPM

## 2024-07-26 DIAGNOSIS — E11.69 TYPE 2 DIABETES MELLITUS WITH OTHER SPECIFIED COMPLICATION, WITHOUT LONG-TERM CURRENT USE OF INSULIN (HCC): ICD-10-CM

## 2024-07-26 DIAGNOSIS — R22.41 KNEE MASS, RIGHT: ICD-10-CM

## 2024-07-26 DIAGNOSIS — Z76.0 ENCOUNTER FOR MEDICATION REFILL: ICD-10-CM

## 2024-07-26 PROCEDURE — 3074F SYST BP LT 130 MM HG: CPT | Performed by: NURSE PRACTITIONER

## 2024-07-26 PROCEDURE — 99213 OFFICE O/P EST LOW 20 MIN: CPT | Performed by: NURSE PRACTITIONER

## 2024-07-26 PROCEDURE — 3078F DIAST BP <80 MM HG: CPT | Performed by: NURSE PRACTITIONER

## 2024-07-26 RX ORDER — METFORMIN HYDROCHLORIDE 500 MG/1
500 TABLET, EXTENDED RELEASE ORAL 2 TIMES DAILY
Qty: 60 TABLET | Refills: 0 | Status: SHIPPED | OUTPATIENT
Start: 2024-07-26 | End: 2024-07-30

## 2024-07-26 ASSESSMENT — ENCOUNTER SYMPTOMS: CONSTITUTIONAL NEGATIVE: 1

## 2024-07-26 ASSESSMENT — VISUAL ACUITY: OU: 1

## 2024-07-30 DIAGNOSIS — E11.69 TYPE 2 DIABETES MELLITUS WITH OTHER SPECIFIED COMPLICATION, WITHOUT LONG-TERM CURRENT USE OF INSULIN (HCC): ICD-10-CM

## 2024-07-30 RX ORDER — METFORMIN HYDROCHLORIDE 500 MG/1
500 TABLET, EXTENDED RELEASE ORAL 2 TIMES DAILY
Qty: 180 TABLET | Refills: 3 | Status: SHIPPED | OUTPATIENT
Start: 2024-07-30

## 2024-07-30 RX ORDER — METFORMIN HYDROCHLORIDE 500 MG/1
1000 TABLET, EXTENDED RELEASE ORAL DAILY
Refills: 0 | OUTPATIENT
Start: 2024-07-30

## 2024-08-08 ENCOUNTER — TELEPHONE (OUTPATIENT)
Dept: HEALTH INFORMATION MANAGEMENT | Facility: OTHER | Age: 54
End: 2024-08-08
Payer: COMMERCIAL

## 2024-12-15 DIAGNOSIS — E78.5 HYPERLIPIDEMIA DUE TO TYPE 2 DIABETES MELLITUS (HCC): ICD-10-CM

## 2024-12-15 DIAGNOSIS — E11.69 HYPERLIPIDEMIA DUE TO TYPE 2 DIABETES MELLITUS (HCC): ICD-10-CM

## 2024-12-17 NOTE — TELEPHONE ENCOUNTER
Received request via: Patient    Was the patient seen in the last year in this department? Yes    Does the patient have an active prescription (recently filled or refills available) for medication(s) requested? No    Pharmacy Name: [facility]    Does the patient have long-term Plus and need 100-day supply? (This applies to ALL medications) Patient does not have SCP

## 2024-12-21 RX ORDER — ATORVASTATIN CALCIUM 10 MG/1
10 TABLET, FILM COATED ORAL EVERY EVENING
Qty: 90 TABLET | Refills: 3 | Status: SHIPPED | OUTPATIENT
Start: 2024-12-21

## 2025-02-05 ENCOUNTER — APPOINTMENT (OUTPATIENT)
Dept: MEDICAL GROUP | Facility: PHYSICIAN GROUP | Age: 55
End: 2025-02-05
Payer: COMMERCIAL

## 2025-04-30 DIAGNOSIS — I10 ESSENTIAL HYPERTENSION: ICD-10-CM

## 2025-05-01 RX ORDER — ENALAPRIL MALEATE 20 MG/1
20 TABLET ORAL DAILY
Qty: 90 TABLET | Refills: 0 | Status: SHIPPED | OUTPATIENT
Start: 2025-05-01

## 2025-05-01 NOTE — TELEPHONE ENCOUNTER
Received request via: Pharmacy    Was the patient seen in the last year in this department? Yes    Does the patient have an active prescription (recently filled or refills available) for medication(s) requested? No    Pharmacy Name:   [facility] - [address] [address]  Phone: [phone] Fax: [phone]       Does the patient have correction Plus and need 100-day supply? (This applies to ALL medications) Patient does not have SCP

## 2025-06-25 ENCOUNTER — TELEPHONE (OUTPATIENT)
Dept: VASCULAR LAB | Facility: MEDICAL CENTER | Age: 55
End: 2025-06-25
Payer: COMMERCIAL

## 2025-06-25 NOTE — TELEPHONE ENCOUNTER
Auto DM pharmacotherapy referral was placed 06/2024 due to A1c>9% but now A1c is well controlled and pt may not need to follow with our clinic.    Unable to LVM with pt. LVM with pt's spouse. Also sent MCM.    Breanna Miner, PharmD

## 2025-06-26 ENCOUNTER — APPOINTMENT (OUTPATIENT)
Dept: MEDICAL GROUP | Facility: PHYSICIAN GROUP | Age: 55
End: 2025-06-26
Payer: COMMERCIAL